# Patient Record
Sex: MALE | Race: WHITE | NOT HISPANIC OR LATINO | Employment: UNEMPLOYED | URBAN - METROPOLITAN AREA
[De-identification: names, ages, dates, MRNs, and addresses within clinical notes are randomized per-mention and may not be internally consistent; named-entity substitution may affect disease eponyms.]

---

## 2017-05-11 ENCOUNTER — OFFICE VISIT (OUTPATIENT)
Dept: URGENT CARE | Facility: CLINIC | Age: 37
End: 2017-05-11
Payer: COMMERCIAL

## 2017-05-11 VITALS
HEART RATE: 102 BPM | OXYGEN SATURATION: 98 % | RESPIRATION RATE: 16 BRPM | TEMPERATURE: 97.6 F | SYSTOLIC BLOOD PRESSURE: 118 MMHG | DIASTOLIC BLOOD PRESSURE: 78 MMHG

## 2017-05-11 DIAGNOSIS — H10.021 PINK EYE, RIGHT: ICD-10-CM

## 2017-05-11 PROCEDURE — 99214 OFFICE O/P EST MOD 30 MIN: CPT | Performed by: FAMILY MEDICINE

## 2017-05-11 RX ORDER — NEOMYCIN POLYMYXIN B SULFATES AND DEXAMETHASONE 3.5; 10000; 1 MG/ML; [USP'U]/ML; MG/ML
2 SUSPENSION/ DROPS OPHTHALMIC 3 TIMES DAILY
Qty: 1 BOTTLE | Refills: 0 | Status: SHIPPED | OUTPATIENT
Start: 2017-05-11 | End: 2017-05-16

## 2017-05-11 ASSESSMENT — ENCOUNTER SYMPTOMS
DIZZINESS: 0
PHOTOPHOBIA: 0
FOCAL WEAKNESS: 0
EYE REDNESS: 1
DOUBLE VISION: 0
EYE PAIN: 0
CHILLS: 0
EYE DISCHARGE: 1
FEVER: 0
BLURRED VISION: 0

## 2017-05-11 NOTE — MR AVS SNAPSHOT
Dinh Gallardo   2017 8:30 AM   Office Visit   MRN: [id number]    Department:  [facility]   Dept Phone:  [phone]    Description:  Male : 1980   Provider:  Cameron Pina M.D.           Reason for Visit     Conjunctivitis Right eye X 6 days      Allergies as of 2017     No Known Allergies      You were diagnosed with     Pink eye, right   [[id number]]         Vital Signs     Blood Pressure Pulse Temperature Respirations Oxygen Saturation Smoking Status    118/78 mmHg 102 36.4 °C (97.6 °F) 16 98% Unknown If Ever Smoked      Basic Information     Date Of Birth Sex Race Ethnicity Preferred Language    1980 Male White Non- English      Health Maintenance        Date Due Completion Dates    IMM DTaP/Tdap/Td Vaccine (1 - Tdap) 3/11/2000 ---            Current Immunizations     No immunizations on file.      Below and/or attached are the medications your provider expects you to take. Review all of your home medications and newly ordered medications with your provider and/or pharmacist. Follow medication instructions as directed by your provider and/or pharmacist. Please keep your medication list with you and share with your provider. Update the information when medications are discontinued, doses are changed, or new medications (including over-the-counter products) are added; and carry medication information at all times in the event of emergency situations     Allergies:  No Known Allergies          Medications  Valid as of: May 11, 2017 -  8:56 AM    Generic Name Brand Name Tablet Size Instructions for use    Acetaminophen (Tab) TYLENOL 325 MG Take  by mouth as needed for Mild Pain.        Ascorbic Acid   Take  by mouth every day.        Calcium Carbonate Antacid   Take  by mouth as needed.        Cyclobenzaprine HCl (Tab) FLEXERIL 10 MG Take 1 Tab by mouth 3 times a day as needed for Mild Pain or Muscle Spasms ((or use qhs) (medicine will cause drowsiness)).        Hydrocodone-Acetaminophen (Tab) NORCO 5-325 MG Take  by mouth every four hours as needed for Mild Pain.        Ibuprofen (Tab) MOTRIN 600 MG Take 1 Tab by mouth every 6 hours as needed for Mild Pain.        Neomycin-Polymyxin-Dexameth (Suspension) MAXITROL 0.1 % Place 2 Drops in right eye 3 times a day for 5 days.        RaNITidine HCl (Cap) ZANTAC 150 MG Take  by mouth as needed.        TraMADol HCl (Tab) ULTRAM 50 MG Take 1 Tab by mouth every 6 hours as needed. for moderate to severe pain        .                 Medicines prescribed today were sent to:     [facility][id number] - [place], [place] - [address]    [address] [facility] [place] [address]    Phone: [phone] Fax: [phone]    Open 24 Hours?: No      Medication refill instructions:       If your prescription bottle indicates you have medication refills left, it is not necessary to call your provider’s office. Please contact your pharmacy and they will refill your medication.    If your prescription bottle indicates you do not have any refills left, you may request refills at any time through one of the following ways: The online [employer] system (except Urgent Care), by calling your provider’s office, or by asking your pharmacy to contact your provider’s office with a refill request. Medication refills are processed only during regular business hours and may not be available until the next business day. Your provider may request additional information or to have a follow-up visit with you prior to refilling your medication.   *Please Note: Medication refills are assigned a new Rx number when refilled electronically. Your pharmacy may indicate that no refills were authorized even though a new prescription for the same medication is available at the pharmacy. Please request the medicine by name with the pharmacy before contacting your provider for a refill.           MyChart Status: Patient Declined

## 2017-05-11 NOTE — Clinical Note
May 11, 2017         Patient: Dinh Gallardo   YOB: 1980   Date of Visit: 5/11/2017           To Whom it May Concern:    Dinh Gallardo was seen in my clinic on 5/11/2017. He may return to work in 2-3 days.    If you have any questions or concerns, please don't hesitate to call.        Sincerely,           Cameron Pina M.D.  Electronically Signed

## 2017-05-11 NOTE — PROGRESS NOTES
Subjective:      Dinh Gallardo is a 37 y.o. male who presents with Conjunctivitis    Chief Complaint   Patient presents with   • Conjunctivitis     Right eye X 6 days        - This is a very pleasant 37 y.o. male with complaints of Rt eye red crusty draining irritated x 3-4 days. No trauma, vision change or contact lens use.           ALLERGIES:  Review of patient's allergies indicates no known allergies.     PMH:  Past Medical History   Diagnosis Date   • Heart burn    • Indigestion    • Other specified disorder of intestines      constipation   • Dental disorder 10/6/09     left upper back tooth pain, denies infection, saw dentist approx 3wks ago   • Pain      right shoulder   • Elevated triglycerides with high cholesterol         MEDS:    Current outpatient prescriptions:   •  neomycin-polymyxin-dexamethasone (MAXITROL) 0.1 % ophthalmic suspension, Place 2 Drops in right eye 3 times a day for 5 days., Disp: 1 Bottle, Rfl: 0  •  cyclobenzaprine (FLEXERIL) 10 MG Tab, Take 1 Tab by mouth 3 times a day as needed for Mild Pain or Muscle Spasms ((or use qhs) (medicine will cause drowsiness))., Disp: 24 Tab, Rfl: 1  •  tramadol (ULTRAM) 50 MG TABS, Take 1 Tab by mouth every 6 hours as needed. for moderate to severe pain, Disp: 15 Each, Rfl: 0  •  hydrocodone-APAP (NORCO) 5-325 MG TABS, Take  by mouth every four hours as needed for Mild Pain., Disp: , Rfl:   •  acetaminophen (TYLENOL) 325 MG TABS, Take  by mouth as needed for Mild Pain., Disp: , Rfl:   •  ranitidine (ZANTAC) 150 MG capsule, Take  by mouth as needed., Disp: , Rfl:   •  Calcium Carbonate Antacid (TUMS PO), Take  by mouth as needed., Disp: , Rfl:   •  Ascorbic Acid (VITAMIN C CR PO), Take  by mouth every day., Disp: , Rfl:   •  IBUPROFEN 600 MG PO TABS, Take 1 Tab by mouth every 6 hours as needed for Mild Pain., Disp: 60 Each, Rfl: 3    ** Past medical, social, family and surgical history documented in HPI or under PMH/PSH/FH section, otherwise it  is contributory **             Conjunctivitis  Pertinent negatives include no chills or fever.       Review of Systems   Constitutional: Negative for fever and chills.   Eyes: Positive for discharge and redness. Negative for blurred vision, double vision, photophobia and pain.   Neurological: Negative for dizziness and focal weakness.          Objective:     /78 mmHg  Pulse 102  Temp(Src) 36.4 °C (97.6 °F)  Resp 16  SpO2 98%     Physical Exam   Constitutional: He appears well-developed. No distress.   HENT:   Head: Normocephalic and atraumatic.   Neck: Neck supple.   Cardiovascular: Regular rhythm.    No murmur heard.  Pulmonary/Chest: Effort normal.   Neurological: He is alert. He exhibits normal muscle tone.   Skin: Skin is warm and dry.   Psychiatric: He has a normal mood and affect. Judgment normal.   Nursing note and vitals reviewed.  Rt eye: injected w/ clear DC and yellow lid crusting. No fb/abrasions.             Assessment/Plan:         1. Pink eye, right  neomycin-polymyxin-dexamethasone (MAXITROL) 0.1 % ophthalmic suspension             Dx & d/c instructions discussed w/ patient and/or family members. Follow up w/ Prvt Dr or here in 3-4 days if not getting better, sooner if needed,  ER if worse and UC/PCP unavailable.        Possible side effects (i.e. Rash, GI upset/constipation, sedation, elevation of BP or sugars) of any medications given discussed.

## 2017-05-31 ENCOUNTER — OFFICE VISIT (OUTPATIENT)
Dept: URGENT CARE | Facility: CLINIC | Age: 37
End: 2017-05-31
Payer: COMMERCIAL

## 2017-05-31 ENCOUNTER — HOSPITAL ENCOUNTER (OUTPATIENT)
Facility: MEDICAL CENTER | Age: 37
End: 2017-05-31
Attending: PHYSICIAN ASSISTANT
Payer: COMMERCIAL

## 2017-05-31 VITALS
BODY MASS INDEX: 29.12 KG/M2 | SYSTOLIC BLOOD PRESSURE: 110 MMHG | TEMPERATURE: 99.5 F | HEIGHT: 72 IN | RESPIRATION RATE: 18 BRPM | OXYGEN SATURATION: 100 % | WEIGHT: 215 LBS | DIASTOLIC BLOOD PRESSURE: 80 MMHG | HEART RATE: 127 BPM

## 2017-05-31 DIAGNOSIS — R30.0 DYSURIA: ICD-10-CM

## 2017-05-31 DIAGNOSIS — R82.81 PYURIA: ICD-10-CM

## 2017-05-31 DIAGNOSIS — Z20.2 STD EXPOSURE: ICD-10-CM

## 2017-05-31 LAB
APPEARANCE UR: NORMAL
BILIRUB UR STRIP-MCNC: NORMAL MG/DL
COLOR UR AUTO: NORMAL
GLUCOSE UR STRIP.AUTO-MCNC: NORMAL MG/DL
KETONES UR STRIP.AUTO-MCNC: NORMAL MG/DL
LEUKOCYTE ESTERASE UR QL STRIP.AUTO: NORMAL
NITRITE UR QL STRIP.AUTO: NORMAL
PH UR STRIP.AUTO: 6 [PH] (ref 5–8)
PROT UR QL STRIP: NORMAL MG/DL
RBC UR QL AUTO: NORMAL
SP GR UR STRIP.AUTO: 1.01
UROBILINOGEN UR STRIP-MCNC: 0.2 MG/DL

## 2017-05-31 PROCEDURE — 96372 THER/PROPH/DIAG INJ SC/IM: CPT | Performed by: PHYSICIAN ASSISTANT

## 2017-05-31 PROCEDURE — 87086 URINE CULTURE/COLONY COUNT: CPT

## 2017-05-31 PROCEDURE — 81002 URINALYSIS NONAUTO W/O SCOPE: CPT | Performed by: PHYSICIAN ASSISTANT

## 2017-05-31 PROCEDURE — 87491 CHLMYD TRACH DNA AMP PROBE: CPT

## 2017-05-31 PROCEDURE — 87077 CULTURE AEROBIC IDENTIFY: CPT

## 2017-05-31 PROCEDURE — 99214 OFFICE O/P EST MOD 30 MIN: CPT | Mod: 25 | Performed by: PHYSICIAN ASSISTANT

## 2017-05-31 PROCEDURE — 87591 N.GONORRHOEAE DNA AMP PROB: CPT

## 2017-05-31 RX ORDER — AZITHROMYCIN 500 MG/1
1000 TABLET, FILM COATED ORAL ONCE
Qty: 2 TAB | Refills: 0 | Status: SHIPPED | OUTPATIENT
Start: 2017-05-31 | End: 2017-05-31

## 2017-05-31 RX ORDER — CEFTRIAXONE SODIUM 250 MG/1
250 INJECTION, POWDER, FOR SOLUTION INTRAMUSCULAR; INTRAVENOUS ONCE
Status: COMPLETED | OUTPATIENT
Start: 2017-05-31 | End: 2017-05-31

## 2017-05-31 RX ADMIN — CEFTRIAXONE SODIUM 250 MG: 250 INJECTION, POWDER, FOR SOLUTION INTRAMUSCULAR; INTRAVENOUS at 09:09

## 2017-05-31 ASSESSMENT — ENCOUNTER SYMPTOMS
COUGH: 0
MYALGIAS: 0
PALPITATIONS: 0
HEADACHES: 1
NAUSEA: 1
BACK PAIN: 1
FEVER: 0
CHILLS: 0
FLANK PAIN: 0
VOMITING: 0
ABDOMINAL PAIN: 0
DIZZINESS: 0

## 2017-05-31 NOTE — PROGRESS NOTES
"Subjective:      Dinh Gallardo is a 37 y.o. male who presents with Penile Discharge    Current medications reviewed.  Past Medical History   Diagnosis Date   • Heart burn    • Indigestion    • Other specified disorder of intestines      constipation   • Dental disorder 10/6/09     left upper back tooth pain, denies infection, saw dentist approx 3wks ago   • Pain      right shoulder   • Elevated triglycerides with high cholesterol      Social History   Substance Use Topics   • Smoking status: Former Smoker   • Smokeless tobacco: None   • Alcohol Use: Yes     Family History Reviewed: noncontributory      Several acts of unprotected sex in the past month.  Burning with urination started 2 weeks ago, went away for 10 days and restarted in last 3 days, now has moderate pyuria with severe dysuria and is in discomfort in office.  Denies lesions around penis or on scrotum, reports scrotum is sore.      HPI    Review of Systems   Constitutional: Positive for malaise/fatigue. Negative for fever and chills.   Respiratory: Negative for cough.    Cardiovascular: Negative for chest pain and palpitations.   Gastrointestinal: Positive for nausea. Negative for vomiting and abdominal pain.   Genitourinary: Positive for dysuria and hematuria. Negative for urgency, frequency and flank pain.   Musculoskeletal: Positive for back pain. Negative for myalgias and joint pain.   Neurological: Positive for headaches. Negative for dizziness.   All other systems reviewed and are negative.         Objective:     /80 mmHg  Pulse 127  Temp(Src) 37.5 °C (99.5 °F)  Resp 18  Ht 1.829 m (6' 0.01\")  Wt 97.523 kg (215 lb)  BMI 29.15 kg/m2  SpO2 100%     Physical Exam   Constitutional: He is oriented to person, place, and time. He appears well-developed and well-nourished. No distress.   Eyes: EOM are normal. Pupils are equal, round, and reactive to light.   Cardiovascular: Normal rate, regular rhythm and normal heart sounds.  "   Pulmonary/Chest: Effort normal and breath sounds normal.   Abdominal: Soft. Normal appearance. There is tenderness in the suprapubic area. There is no CVA tenderness. Hernia confirmed negative in the right inguinal area and confirmed negative in the left inguinal area.   Genitourinary: Cremasteric reflex is present. Right testis shows swelling and tenderness. Left testis shows swelling and tenderness. Uncircumcised. Penile erythema and penile tenderness present. Discharge found.   Uncircumcised penis with mild foreskin inflammation and erythema, penile meatus mild inflammation and erythema with clear discharge. Bilateral scrotum general erythema with warmth and tenderness.   Lymphadenopathy: No inguinal adenopathy noted on the right or left side.   Neurological: He is alert and oriented to person, place, and time.   Skin: Skin is warm and dry.   Nursing note and vitals reviewed.              Assessment/Plan:     1. STD exposure  CHLAMYDIA/GC PCR URINE OR SWAB    cefTRIAXone (ROCEPHIN) injection 250 mg    azithromycin (ZITHROMAX) 500 MG tablet   2. Dysuria  CHLAMYDIA/GC PCR URINE OR SWAB    cefTRIAXone (ROCEPHIN) injection 250 mg    azithromycin (ZITHROMAX) 500 MG tablet   3. Pyuria  CHLAMYDIA/GC PCR URINE OR SWAB    cefTRIAXone (ROCEPHIN) injection 250 mg    azithromycin (ZITHROMAX) 500 MG tablet     UA: large LE, 300 protein, 50 ketones, large heme: will culture  GC/Chlam, urine culture  250 mg Rocephin IM given in clinic, 1 g azithromycin sent to pharmacy.  Christo: [phone] VM OK with cultures, will have him inform partners also with positive result.  Push fluids, rest, work note given for tonight. No sexual activity for one week after symptoms have resolved. Patient reports understanding and agrees with plan.    6/3/17: Phone call to patient, no answer, detailed voicemail left with lab results.  Urine culture positive for gonorrhea, patient was given Rocephin injection in clinic.  Gonorrhea/chlamydia testing  has not been finalized yet but informed patient on message testing, most likely show the same.  Return to urgent care if not greatly improved at this point.  Again reminded him to tell all partners of infection and restraint from sexual activity over the next week.

## 2017-05-31 NOTE — Clinical Note
May 31, 2017         Patient: Dinh Gallardo   YOB: 1980   Date of Visit: 5/31/2017           To Whom it May Concern:    Dinh Gallardo was seen in my clinic on 5/31/2017. He may return to work tomorrow.    If you have any questions or concerns, please don't hesitate to call.        Sincerely,           Johnson Mcdermott PA-C  Electronically Signed

## 2017-06-03 LAB
BACTERIA UR CULT: ABNORMAL
BACTERIA UR CULT: ABNORMAL
SIGNIFICANT IND 70042: ABNORMAL
SITE SITE: ABNORMAL
SOURCE SOURCE: ABNORMAL

## 2017-06-06 ENCOUNTER — TELEPHONE (OUTPATIENT)
Dept: URGENT CARE | Facility: CLINIC | Age: 37
End: 2017-06-06

## 2017-10-10 ENCOUNTER — APPOINTMENT (OUTPATIENT)
Dept: ADMISSIONS | Facility: MEDICAL CENTER | Age: 37
End: 2017-10-10
Attending: SURGERY
Payer: COMMERCIAL

## 2017-10-10 RX ORDER — AZITHROMYCIN 500 MG/1
500 TABLET, FILM COATED ORAL DAILY
COMMUNITY

## 2017-10-10 RX ORDER — FAMOTIDINE 20 MG/1
20 TABLET, FILM COATED ORAL
COMMUNITY

## 2017-10-10 RX ORDER — IBUPROFEN 400 MG/1
400 TABLET ORAL EVERY 6 HOURS PRN
COMMUNITY

## 2017-10-17 ENCOUNTER — HOSPITAL ENCOUNTER (OUTPATIENT)
Facility: MEDICAL CENTER | Age: 37
End: 2017-10-17
Attending: SURGERY | Admitting: SURGERY
Payer: COMMERCIAL

## 2017-10-17 VITALS
BODY MASS INDEX: 31.47 KG/M2 | HEIGHT: 72 IN | SYSTOLIC BLOOD PRESSURE: 133 MMHG | WEIGHT: 232.37 LBS | OXYGEN SATURATION: 96 % | HEART RATE: 68 BPM | DIASTOLIC BLOOD PRESSURE: 108 MMHG | RESPIRATION RATE: 18 BRPM | TEMPERATURE: 97.8 F

## 2017-10-17 PROBLEM — K42.9 UMBILICAL HERNIA WITHOUT OBSTRUCTION OR GANGRENE: Status: ACTIVE | Noted: 2017-10-17

## 2017-10-17 PROCEDURE — 700111 HCHG RX REV CODE 636 W/ 250 OVERRIDE (IP)

## 2017-10-17 PROCEDURE — 160035 HCHG PACU - 1ST 60 MINS PHASE I: Performed by: SURGERY

## 2017-10-17 PROCEDURE — C1781 MESH (IMPLANTABLE): HCPCS | Performed by: SURGERY

## 2017-10-17 PROCEDURE — 160036 HCHG PACU - EA ADDL 30 MINS PHASE I: Performed by: SURGERY

## 2017-10-17 PROCEDURE — 700101 HCHG RX REV CODE 250

## 2017-10-17 PROCEDURE — A6402 STERILE GAUZE <= 16 SQ IN: HCPCS | Performed by: SURGERY

## 2017-10-17 PROCEDURE — 160048 HCHG OR STATISTICAL LEVEL 1-5: Performed by: SURGERY

## 2017-10-17 PROCEDURE — 160025 RECOVERY II MINUTES (STATS): Performed by: SURGERY

## 2017-10-17 PROCEDURE — 160027 HCHG SURGERY MINUTES - 1ST 30 MINS LEVEL 2: Performed by: SURGERY

## 2017-10-17 PROCEDURE — 700102 HCHG RX REV CODE 250 W/ 637 OVERRIDE(OP)

## 2017-10-17 PROCEDURE — A9270 NON-COVERED ITEM OR SERVICE: HCPCS

## 2017-10-17 PROCEDURE — 160002 HCHG RECOVERY MINUTES (STAT): Performed by: SURGERY

## 2017-10-17 PROCEDURE — 160009 HCHG ANES TIME/MIN: Performed by: SURGERY

## 2017-10-17 PROCEDURE — 160038 HCHG SURGERY MINUTES - EA ADDL 1 MIN LEVEL 2: Performed by: SURGERY

## 2017-10-17 PROCEDURE — 500389 HCHG DRAIN, RESERVOIR SUCT JP 100CC: Performed by: SURGERY

## 2017-10-17 PROCEDURE — 160046 HCHG PACU - 1ST 60 MINS PHASE II: Performed by: SURGERY

## 2017-10-17 PROCEDURE — 501838 HCHG SUTURE GENERAL: Performed by: SURGERY

## 2017-10-17 PROCEDURE — 160047 HCHG PACU  - EA ADDL 30 MINS PHASE II: Performed by: SURGERY

## 2017-10-17 DEVICE — MESH VENTRALEX ST W/STRAP - 4.3CM SMALL (1EA/CA): Type: IMPLANTABLE DEVICE | Status: FUNCTIONAL

## 2017-10-17 RX ORDER — BUPIVACAINE HYDROCHLORIDE AND EPINEPHRINE 5; 5 MG/ML; UG/ML
INJECTION, SOLUTION EPIDURAL; INTRACAUDAL; PERINEURAL
Status: DISCONTINUED | OUTPATIENT
Start: 2017-10-17 | End: 2017-10-17 | Stop reason: HOSPADM

## 2017-10-17 RX ORDER — GABAPENTIN 300 MG/1
CAPSULE ORAL
Status: DISCONTINUED
Start: 2017-10-17 | End: 2017-10-17 | Stop reason: HOSPADM

## 2017-10-17 RX ORDER — LIDOCAINE HYDROCHLORIDE 10 MG/ML
0.5 INJECTION, SOLUTION INFILTRATION; PERINEURAL
Status: DISCONTINUED | OUTPATIENT
Start: 2017-10-17 | End: 2017-10-17 | Stop reason: HOSPADM

## 2017-10-17 RX ORDER — ACETAMINOPHEN 500 MG
TABLET ORAL
Status: DISCONTINUED
Start: 2017-10-17 | End: 2017-10-17 | Stop reason: HOSPADM

## 2017-10-17 RX ORDER — MIDAZOLAM HYDROCHLORIDE 1 MG/ML
INJECTION INTRAMUSCULAR; INTRAVENOUS
Status: DISCONTINUED
Start: 2017-10-17 | End: 2017-10-17 | Stop reason: HOSPADM

## 2017-10-17 RX ORDER — SODIUM CHLORIDE, SODIUM LACTATE, POTASSIUM CHLORIDE, CALCIUM CHLORIDE 600; 310; 30; 20 MG/100ML; MG/100ML; MG/100ML; MG/100ML
INJECTION, SOLUTION INTRAVENOUS CONTINUOUS
Status: DISCONTINUED | OUTPATIENT
Start: 2017-10-17 | End: 2017-10-17 | Stop reason: HOSPADM

## 2017-10-17 RX ORDER — LIDOCAINE AND PRILOCAINE 25; 25 MG/G; MG/G
1 CREAM TOPICAL
Status: DISCONTINUED | OUTPATIENT
Start: 2017-10-17 | End: 2017-10-17 | Stop reason: HOSPADM

## 2017-10-17 RX ORDER — CELECOXIB 200 MG/1
CAPSULE ORAL
Status: DISCONTINUED
Start: 2017-10-17 | End: 2017-10-17 | Stop reason: HOSPADM

## 2017-10-17 RX ADMIN — HYDROCODONE BITARTRATE AND ACETAMINOPHEN 15 ML: 2.5; 108 SOLUTION ORAL at 16:20

## 2017-10-17 RX ADMIN — SODIUM CHLORIDE, SODIUM LACTATE, POTASSIUM CHLORIDE, CALCIUM CHLORIDE: 600; 310; 30; 20 INJECTION, SOLUTION INTRAVENOUS at 14:15

## 2017-10-17 ASSESSMENT — PAIN SCALES - GENERAL
PAINLEVEL_OUTOF10: 0

## 2017-10-17 NOTE — DISCHARGE INSTRUCTIONS
ACTIVITY: Rest and take it easy for the first 24 hours.  A responsible adult is recommended to remain with you during that time.  It is normal to feel sleepy.  We encourage you to not do anything that requires balance, judgment or coordination.    MILD FLU-LIKE SYMPTOMS ARE NORMAL. YOU MAY EXPERIENCE GENERALIZED MUSCLE ACHES, THROAT IRRITATION, HEADACHE AND/OR SOME NAUSEA.    FOR 24 HOURS DO NOT:  Drive, operate machinery or run household appliances.  Drink beer or alcoholic beverages.   Make important decisions or sign legal documents.    SPECIAL INSTRUCTIONS: No twisting of abdomen.    DIET: To avoid nausea, slowly advance diet as tolerated, avoiding spicy or greasy foods for the first day.  Add more substantial food to your diet according to your physician's instructions.  Babies can be fed formula or breast milk as soon as they are hungry.  INCREASE FLUIDS AND FIBER TO AVOID CONSTIPATION.    SURGICAL DRESSING/BATHING: Patient may shower.    FOLLOW-UP APPOINTMENT:  A follow-up appointment should be arranged with your doctor in one week; call to schedule.    You should CALL YOUR PHYSICIAN if you develop:  Fever greater than 101 degrees F.  Pain not relieved by medication, or persistent nausea or vomiting.  Excessive bleeding (blood soaking through dressing) or unexpected drainage from the wound.  Extreme redness or swelling around the incision site, drainage of pus or foul smelling drainage.  Inability to urinate or empty your bladder within 8 hours.  Problems with breathing or chest pain.    You should call 911 if you develop problems with breathing or chest pain.  If you are unable to contact your doctor or surgical center, you should go to the nearest emergency room or urgent care center.  Physician's telephone #: [phone]    If any questions arise, call your doctor.  If your doctor is not available, please feel free to call the Surgical Center at {Surgical Dept Numbers:[id number]}.  The Center is open Monday  through Friday from 7AM to 7PM.  You can also call the HEALTH HOTLINE open 24 hours/day, 7 days/week and speak to a nurse at [phone], or toll free at [phone].    A registered nurse may call you a few days after your surgery to see how you are doing after your procedure.    MEDICATIONS: Resume taking daily medication.  Take prescribed pain medication with food.  If no medication is prescribed, you may take non-aspirin pain medication if needed.  PAIN MEDICATION CAN BE VERY CONSTIPATING.  Take a stool softener or laxative such as senokot, pericolace, or milk of magnesia if needed.    Prescription given for Norco.  Last pain medication given at 4:30 pm.    If your physician has prescribed pain medication that includes Acetaminophen (Tylenol), do not take additional Acetaminophen (Tylenol) while taking the prescribed medication.    Depression / Suicide Risk    As you are discharged from this [facility] facility, it is important to learn how to keep safe from harming yourself.    Recognize the warning signs:  · Abrupt changes in personality, positive or negative- including increase in energy   · Giving away possessions  · Change in eating patterns- significant weight changes-  positive or negative  · Change in sleeping patterns- unable to sleep or sleeping all the time   · Unwillingness or inability to communicate  · Depression  · Unusual sadness, discouragement and loneliness  · Talk of wanting to die  · Neglect of personal appearance   · Rebelliousness- reckless behavior  · Withdrawal from people/activities they love  · Confusion- inability to concentrate     If you or a loved one observes any of these behaviors or has concerns about self-harm, here's what you can do:  · Talk about it- your feelings and reasons for harming yourself  · Remove any means that you might use to hurt yourself (examples: pills, rope, extension cords, firearm)  · Get professional help from the community (Mental Health,  Substance Abuse, psychological counseling)  · Do not be alone:Call your Safe Contact- someone whom you trust who will be there for you.  · Call your local CRISIS HOTLINE 411-5888 or 971-624-6297  · Call your local Children's Mobile Crisis Response Team [place] (457) 316-3645 or www.[employer]  · Call the toll free National Suicide Prevention Hotlines   · National Suicide Prevention Lifeline 330-535-MPQV (2919)  · National Hope Line Network 800-SUICIDE (370-7298)

## 2017-10-17 NOTE — OP REPORT
DATE OF SERVICE:  10/17/2017    PREOPERATIVE DIAGNOSIS:  Incarcerated umbilical hernia.    POSTOPERATIVE DIAGNOSIS:  Incarcerated umbilical hernia.    OPERATION PERFORMED:  Repair of incarcerated umbilical hernia with mesh   implantation, reconstruction.    SURGEON:  Floyd Panda MD    ANESTHESIOLOGIST:  Curly Lennon MD    ANESTHESIA:  General.    OPERATIVE NOTE:  The patient, 37 years of age, presents with an incarcerated   umbilical hernia.  The patient was felt to be a candidate for elective repair.    The risks, possible complications of operation were explained to the patient   in detail.  He understood and accepted those risks and wished to proceed.  He   did receive postoperative care instructions, prophylactic antibiotics, had   sequential stockings applied as anti-embolism prophylaxis.  He did receive a   prescription for Norco 7.5/325, #30.  He was taken to the operating room and   placed under anesthesia by Dr. Lennon.  His abdomen was shaved and prepped   with ChloraPrep solution, sterile drapes were applied.  A time-out was   affected.  A solution of 0.5% Marcaine with epinephrine was liberally   infiltrated around the umbilicus.  A supraumbilical incision was made, carried   down through skin into the subcutaneous tissue.  The umbilical sac was then   circumferentially mobilized down to the fascia using sharp dissection   electrocautery.  Ultimately, the fascial attachments were released   circumferentially using electrocautery and the sac was reduced.  The   preperitoneal space was created using electrocautery and countertraction.  A   4.3 cm Ventralex ST mesh was inserted, unfurled, and lay smoothly.  The tails   were resected.  The fascia was closed through the tails using interrupted 2-0   PDS sutures.  Additional Marcaine was injected in the subcutaneous tissues and   fascial levels.  The patient had the umbilical skin tacked down using a 2-0   Vicryl suture to the superior fascia.  The  patient had the skin closed using   running 4-0 Monocryl subcuticular and then sealed with Dermabond.  The patient   was awakened, extubated and taken to recovery room in stable satisfactory   condition.  Blood loss was truly minimal.  Sponge, instrument counts were   correct and there were no intraoperative complications.       ____________________________________     MD TOBIAS WASHINGTON / OMID    DD:  10/17/2017 16:10:10  DT:  10/17/2017 16:23:56    D#:  [id number]  Job#:  [id number]    cc: PRICILA HERNANDEZ MD

## 2017-10-17 NOTE — OR SURGEON
Operative Report    PreOp Diagnosis: inc [facility]    PostOp Diagnosis: trey    Procedure(s):   Inc UMBILICAL HERNIA REPAIR WITH MESH PLACEMENT - Wound Class: Clean    Surgeon(s):  Floyd Panda M.D.    Anesthesiologist/Type of Anesthesia:  Anesthesiologist: Curly Alejo M.D./General    Surgical Staff:  Circulator: Emani Paige R.N.  Scrub Person: Mila Ceron    Specimens:  0    Estimated Blood Loss: min    Findings: inc     Complications: 0        10/17/2017 4:06 PM Floyd Panad

## 2018-04-12 ENCOUNTER — OFFICE VISIT (OUTPATIENT)
Dept: URGENT CARE | Facility: CLINIC | Age: 38
End: 2018-04-12
Payer: COMMERCIAL

## 2018-04-12 ENCOUNTER — HOSPITAL ENCOUNTER (OUTPATIENT)
Facility: MEDICAL CENTER | Age: 38
End: 2018-04-12
Attending: FAMILY MEDICINE
Payer: COMMERCIAL

## 2018-04-12 VITALS
SYSTOLIC BLOOD PRESSURE: 132 MMHG | OXYGEN SATURATION: 97 % | RESPIRATION RATE: 16 BRPM | DIASTOLIC BLOOD PRESSURE: 94 MMHG | HEIGHT: 72 IN | HEART RATE: 84 BPM | BODY MASS INDEX: 32.51 KG/M2 | WEIGHT: 240 LBS | TEMPERATURE: 97.9 F

## 2018-04-12 DIAGNOSIS — M10.00 ACUTE IDIOPATHIC GOUT, UNSPECIFIED SITE: ICD-10-CM

## 2018-04-12 DIAGNOSIS — M79.672 FOOT PAIN, LEFT: ICD-10-CM

## 2018-04-12 DIAGNOSIS — Z21 HIV POSITIVE (HCC): ICD-10-CM

## 2018-04-12 DIAGNOSIS — M10.072 ACUTE IDIOPATHIC GOUT OF LEFT FOOT: ICD-10-CM

## 2018-04-12 LAB
BASOPHILS # BLD AUTO: 0.7 % (ref 0–1.8)
BASOPHILS # BLD: 0.08 K/UL (ref 0–0.12)
EOSINOPHIL # BLD AUTO: 0.2 K/UL (ref 0–0.51)
EOSINOPHIL NFR BLD: 1.7 % (ref 0–6.9)
ERYTHROCYTE [DISTWIDTH] IN BLOOD BY AUTOMATED COUNT: 40.5 FL (ref 35.9–50)
HCT VFR BLD AUTO: 47.6 % (ref 42–52)
HGB BLD-MCNC: 16.4 G/DL (ref 14–18)
IMM GRANULOCYTES # BLD AUTO: 0.05 K/UL (ref 0–0.11)
IMM GRANULOCYTES NFR BLD AUTO: 0.4 % (ref 0–0.9)
LYMPHOCYTES # BLD AUTO: 3.77 K/UL (ref 1–4.8)
LYMPHOCYTES NFR BLD: 32.4 % (ref 22–41)
MCH RBC QN AUTO: 30.4 PG (ref 27–33)
MCHC RBC AUTO-ENTMCNC: 34.5 G/DL (ref 33.7–35.3)
MCV RBC AUTO: 88.3 FL (ref 81.4–97.8)
MONOCYTES # BLD AUTO: 0.9 K/UL (ref 0–0.85)
MONOCYTES NFR BLD AUTO: 7.7 % (ref 0–13.4)
NEUTROPHILS # BLD AUTO: 6.64 K/UL (ref 1.82–7.42)
NEUTROPHILS NFR BLD: 57.1 % (ref 44–72)
NRBC # BLD AUTO: 0 K/UL
NRBC BLD-RTO: 0 /100 WBC
PLATELET # BLD AUTO: 274 K/UL (ref 164–446)
PMV BLD AUTO: 11.6 FL (ref 9–12.9)
RBC # BLD AUTO: 5.39 M/UL (ref 4.7–6.1)
URATE SERPL-MCNC: 9.2 MG/DL (ref 2.5–8.3)
WBC # BLD AUTO: 11.6 K/UL (ref 4.8–10.8)

## 2018-04-12 PROCEDURE — 85025 COMPLETE CBC W/AUTO DIFF WBC: CPT

## 2018-04-12 PROCEDURE — 84550 ASSAY OF BLOOD/URIC ACID: CPT

## 2018-04-12 PROCEDURE — 99214 OFFICE O/P EST MOD 30 MIN: CPT | Performed by: FAMILY MEDICINE

## 2018-04-12 RX ORDER — INDOMETHACIN 50 MG/1
50 CAPSULE ORAL 3 TIMES DAILY PRN
Qty: 15 CAP | Refills: 0 | Status: SHIPPED | OUTPATIENT
Start: 2018-04-12

## 2018-04-12 RX ORDER — AMOXICILLIN AND CLAVULANATE POTASSIUM 875; 125 MG/1; MG/1
1 TABLET, FILM COATED ORAL 2 TIMES DAILY
Qty: 20 TAB | Refills: 0 | Status: SHIPPED | OUTPATIENT
Start: 2018-04-12

## 2018-04-12 RX ORDER — HYDROCODONE BITARTRATE AND ACETAMINOPHEN 5; 300 MG/1; MG/1
1 TABLET ORAL 3 TIMES DAILY PRN
Qty: 15 TAB | Refills: 0 | Status: SHIPPED | OUTPATIENT
Start: 2018-04-12 | End: 2018-04-17

## 2018-04-12 RX ORDER — ACETAMINOPHEN 500 MG
500-1000 TABLET ORAL EVERY 6 HOURS PRN
COMMUNITY

## 2018-04-12 ASSESSMENT — ENCOUNTER SYMPTOMS
PSYCHIATRIC NEGATIVE: 1
DIAPHORESIS: 0
NAUSEA: 0
WEAKNESS: 0
EYES NEGATIVE: 1
FEVER: 0
HEADACHES: 0
VOMITING: 0
CHILLS: 0
DIZZINESS: 0
SORE THROAT: 0
CARDIOVASCULAR NEGATIVE: 1
FALLS: 0
RESPIRATORY NEGATIVE: 1

## 2018-04-12 NOTE — PROGRESS NOTES
Subjective:      Dinh Gallardo is a 38 y.o. male who presents with Foot Swelling (left foot, started today around 2am, has progressed, first time, suspects due to family med hx, no injury)    Patient denies any history of recent trauma. He awoke at 2 AM this morning with intense lasts foot pain laterally he noticed some redness as well denies feeling feverish or nauseous states that his dad and brother have a history of gout he was assuming this is possibly his first episode with it. Denies any numbness or weakness distally. Patient does have a history of being HIV positive has been stable on this medication recently have labs in January with a good T-cell count of 1500 and virus load undetectable    HPI  Review of Systems   Constitutional: Negative for chills, diaphoresis, fever and malaise/fatigue.   HENT: Negative for sore throat.    Eyes: Negative.    Respiratory: Negative.    Cardiovascular: Negative.    Gastrointestinal: Negative for nausea and vomiting.   Genitourinary: Negative.    Musculoskeletal: Positive for joint pain. Negative for falls.   Skin: Negative for itching and rash.   Neurological: Negative for dizziness, weakness and headaches.   Endo/Heme/Allergies: Negative.    Psychiatric/Behavioral: Negative.      PMH:  has a past medical history of Cold (10/07/2017); Dental disorder (10/6/09); Elevated triglycerides with high cholesterol; Heart burn; HIV (human immunodeficiency virus infection) (CMS-[facility]); Indigestion; Other specified disorder of intestines (10/08/2017); Pain; and Psychiatric problem.  MEDS:   Current Outpatient Prescriptions:   •  acetaminophen (TYLENOL) 500 MG Tab, Take 500-1,000 mg by mouth every 6 hours as needed., Disp: , Rfl:   •  Abacavir-Dolutegravir-Lamivud (TRIUMEQ) 600- MG Tab, Take 1 Tab by mouth every evening., Disp: , Rfl:   •  Ascorbic Acid (VITAMIN C CR PO), Take 1 Tab by mouth every day., Disp: , Rfl:   •  azithromycin (ZITHROMAX) 500 MG tablet, Take 500 mg  "by mouth every day., Disp: , Rfl:   •  Pseudoephedrine-APAP-DM (TYLENOL COLD/FLU SEVERE DAY PO), Take 2 Tabs by mouth 2 times a day as needed., Disp: , Rfl:   •  famotidine (PEPCID) 20 MG Tab, Take 20 mg by mouth 1 time daily as needed., Disp: , Rfl:   •  ibuprofen (MOTRIN) 400 MG Tab, Take 400 mg by mouth every 6 hours as needed., Disp: , Rfl:   ALLERGIES: No Known Allergies  SURGHX:   Past Surgical History:   Procedure Laterality Date   • UMBILICAL HERNIA REPAIR  10/17/2017    Procedure: UMBILICAL HERNIA REPAIR WITH MESH PLACEMENT;  Surgeon: Floyd Panda M.D.;  Location: SURGERY [facility];  Service: General   • SHOULDER DECOMPRESSION ARTHROSCOPIC  10/13/2009    Performed by BRITTANY HALEY at [facility]   • SHOULDER ARTHROSCOPY W/ SLAP / LABRAL REPAIR  10/13/2009    Performed by BRITTANY HALEY at [facility]   • SHOULDER ARTHROSCOPY W/ BICIPITAL TENODESIS REPAIR  10/13/2009    Performed by BRITTANY HALEY at [facility]   • TONSILLECTOMY  5/2009   • SHOULDER ARTHROSCOPY  3/12/09    right   • APPENDECTOMY CHILD  1993   • OTHER SURGICAL PROCEDURE  1984    \"urinary tract surgery\"   SOCHX:  reports that he has quit smoking. He uses smokeless tobacco. He reports that he drinks alcohol. He reports that he uses drugs.  FH: Family history was reviewed, no pertinent findings to report     Objective:     /94   Pulse 84   Temp 36.6 °C (97.9 °F)   Resp 16   Ht 1.829 m (6')   Wt 108.9 kg (240 lb)   SpO2 97%   BMI 32.55 kg/m²      Physical Exam   Constitutional: He is oriented to person, place, and time. He appears well-developed and well-nourished. No distress.   HENT:   Mouth/Throat: Oropharynx is clear and moist.   Eyes: Conjunctivae and EOM are normal. Pupils are equal, round, and reactive to light.   Neck: Normal range of motion.   Cardiovascular: Normal rate, regular rhythm, normal heart sounds and intact distal pulses.  Exam reveals no gallop and no " friction rub.    No murmur heard.  Pulmonary/Chest: Effort normal and breath sounds normal. No respiratory distress. He has no wheezes. He has no rales. He exhibits no tenderness.   Musculoskeletal: Normal range of motion. He exhibits no edema or deformity.        Left foot: There is tenderness.        Feet:    Erythema of skin     Neurological: He is alert and oriented to person, place, and time.   Skin: Skin is warm. He is not diaphoretic. There is erythema.   Psychiatric: He has a normal mood and affect. His behavior is normal.         Diagnostics: uric acid elevated, cbc with wbc elevated consistent with acute gout attack patient notified to not take augmentin   Assessment/Plan:     1. Foot pain, left  indomethacin (INDOCIN) 50 MG Cap    Hydrocodone-Acetaminophen (VICODIN) 5-300 MG Tab    CONSENT FOR OPIATE PRESCRIPTION    amoxicillin-clavulanate (AUGMENTIN) 875-125 MG Tab    CBC WITH DIFFERENTIAL    URIC ACID    CANCELED: CBC WITH DIFFERENTIAL    CANCELED: URIC ACID   2. Acute idiopathic gout of left foot  indomethacin (INDOCIN) 50 MG Cap    Hydrocodone-Acetaminophen (VICODIN) 5-300 MG Tab    CONSENT FOR OPIATE PRESCRIPTION    amoxicillin-clavulanate (AUGMENTIN) 875-125 MG Tab    CBC WITH DIFFERENTIAL    URIC ACID   3. HIV positive (CMS-[facility])     4. Acute idiopathic gout, unspecified site         Chronic conditions of HIV that may potentially impact the patient's ability to recover from this infection appear stable. The patient will f/u with their pcp and continue with current chronic medications as directed.

## 2018-06-06 ENCOUNTER — HOSPITAL ENCOUNTER (OUTPATIENT)
Dept: LAB | Facility: MEDICAL CENTER | Age: 38
End: 2018-06-06
Attending: PHYSICIAN ASSISTANT
Payer: COMMERCIAL

## 2018-06-06 PROCEDURE — 86803 HEPATITIS C AB TEST: CPT

## 2018-06-06 PROCEDURE — 80053 COMPREHEN METABOLIC PANEL: CPT

## 2018-06-06 PROCEDURE — 87536 HIV-1 QUANT&REVRSE TRNSCRPJ: CPT

## 2018-06-07 LAB
ALBUMIN SERPL BCP-MCNC: 5 G/DL (ref 3.2–4.9)
ALBUMIN/GLOB SERPL: 1.9 G/DL
ALP SERPL-CCNC: 56 U/L (ref 30–99)
ALT SERPL-CCNC: 58 U/L (ref 2–50)
ANION GAP SERPL CALC-SCNC: 12 MMOL/L (ref 0–11.9)
AST SERPL-CCNC: 26 U/L (ref 12–45)
BILIRUB SERPL-MCNC: 0.7 MG/DL (ref 0.1–1.5)
BUN SERPL-MCNC: 15 MG/DL (ref 8–22)
CALCIUM SERPL-MCNC: 9.6 MG/DL (ref 8.5–10.5)
CHLORIDE SERPL-SCNC: 101 MMOL/L (ref 96–112)
CO2 SERPL-SCNC: 25 MMOL/L (ref 20–33)
CREAT SERPL-MCNC: 1.26 MG/DL (ref 0.5–1.4)
GLOBULIN SER CALC-MCNC: 2.6 G/DL (ref 1.9–3.5)
GLUCOSE SERPL-MCNC: 81 MG/DL (ref 65–99)
HCV AB SER QL: NEGATIVE
POTASSIUM SERPL-SCNC: 4.2 MMOL/L (ref 3.6–5.5)
PROT SERPL-MCNC: 7.6 G/DL (ref 6–8.2)
SODIUM SERPL-SCNC: 138 MMOL/L (ref 135–145)

## 2018-06-09 LAB
HIV1 RNA # SERPL NAA+PROBE: <20 CPY/ML
HIV1 RNA SER-IMP: NOT DETECTED
HIV1 RNA SERPL NAA+PROBE-LOG#: <1.3 LOG

## 2018-10-17 ENCOUNTER — HOSPITAL ENCOUNTER (OUTPATIENT)
Dept: LAB | Facility: MEDICAL CENTER | Age: 38
End: 2018-10-17
Attending: PHYSICIAN ASSISTANT
Payer: COMMERCIAL

## 2018-10-17 LAB
ALBUMIN SERPL BCP-MCNC: 4.7 G/DL (ref 3.2–4.9)
ALBUMIN/GLOB SERPL: 2 G/DL
ALP SERPL-CCNC: 58 U/L (ref 30–99)
ALT SERPL-CCNC: 52 U/L (ref 2–50)
ANION GAP SERPL CALC-SCNC: 10 MMOL/L (ref 0–11.9)
AST SERPL-CCNC: 29 U/L (ref 12–45)
BILIRUB SERPL-MCNC: 0.6 MG/DL (ref 0.1–1.5)
BUN SERPL-MCNC: 18 MG/DL (ref 8–22)
CALCIUM SERPL-MCNC: 9.7 MG/DL (ref 8.5–10.5)
CHLORIDE SERPL-SCNC: 104 MMOL/L (ref 96–112)
CO2 SERPL-SCNC: 28 MMOL/L (ref 20–33)
CREAT SERPL-MCNC: 1.27 MG/DL (ref 0.5–1.4)
GLOBULIN SER CALC-MCNC: 2.4 G/DL (ref 1.9–3.5)
GLUCOSE SERPL-MCNC: 68 MG/DL (ref 65–99)
POTASSIUM SERPL-SCNC: 4.2 MMOL/L (ref 3.6–5.5)
PROT SERPL-MCNC: 7.1 G/DL (ref 6–8.2)
SODIUM SERPL-SCNC: 142 MMOL/L (ref 135–145)

## 2018-10-17 PROCEDURE — 87536 HIV-1 QUANT&REVRSE TRNSCRPJ: CPT

## 2018-10-17 PROCEDURE — 80053 COMPREHEN METABOLIC PANEL: CPT

## 2018-10-21 LAB
HIV1 RNA # SERPL NAA+PROBE: ABNORMAL CPY/ML
HIV1 RNA SER-IMP: DETECTED
HIV1 RNA SERPL NAA+PROBE-LOG#: ABNORMAL LOG

## 2018-11-01 ENCOUNTER — HOSPITAL ENCOUNTER (OUTPATIENT)
Dept: RADIOLOGY | Facility: MEDICAL CENTER | Age: 38
End: 2018-11-01
Attending: FAMILY MEDICINE
Payer: COMMERCIAL

## 2018-11-01 DIAGNOSIS — K81.9 CHOLECYSTITIS, UNSPECIFIED: ICD-10-CM

## 2018-11-01 PROCEDURE — 76700 US EXAM ABDOM COMPLETE: CPT

## 2019-02-20 ENCOUNTER — HOSPITAL ENCOUNTER (OUTPATIENT)
Dept: LAB | Facility: MEDICAL CENTER | Age: 39
End: 2019-02-20
Attending: PHYSICIAN ASSISTANT
Payer: COMMERCIAL

## 2019-02-20 LAB
ALBUMIN SERPL BCP-MCNC: 5.1 G/DL (ref 3.2–4.9)
ALBUMIN/GLOB SERPL: 1.8 G/DL
ALP SERPL-CCNC: 58 U/L (ref 30–99)
ALT SERPL-CCNC: 34 U/L (ref 2–50)
ANION GAP SERPL CALC-SCNC: 9 MMOL/L (ref 0–11.9)
AST SERPL-CCNC: 23 U/L (ref 12–45)
BILIRUB SERPL-MCNC: 0.9 MG/DL (ref 0.1–1.5)
BUN SERPL-MCNC: 15 MG/DL (ref 8–22)
CALCIUM SERPL-MCNC: 10.3 MG/DL (ref 8.5–10.5)
CHLORIDE SERPL-SCNC: 102 MMOL/L (ref 96–112)
CO2 SERPL-SCNC: 28 MMOL/L (ref 20–33)
CREAT SERPL-MCNC: 1.39 MG/DL (ref 0.5–1.4)
GLOBULIN SER CALC-MCNC: 2.8 G/DL (ref 1.9–3.5)
GLUCOSE SERPL-MCNC: 84 MG/DL (ref 65–99)
POTASSIUM SERPL-SCNC: 4.6 MMOL/L (ref 3.6–5.5)
PROT SERPL-MCNC: 7.9 G/DL (ref 6–8.2)
SODIUM SERPL-SCNC: 139 MMOL/L (ref 135–145)

## 2019-02-20 PROCEDURE — 80053 COMPREHEN METABOLIC PANEL: CPT

## 2019-02-20 PROCEDURE — 87536 HIV-1 QUANT&REVRSE TRNSCRPJ: CPT

## 2019-02-22 LAB
HIV1 RNA # SERPL NAA+PROBE: NOT DETECTED CPY/ML
HIV1 RNA SERPL NAA+PROBE-LOG#: NOT DETECTED LOG CPY/ML
HIV1 RNA SERPL QL NAA+PROBE: NOT DETECTED

## 2019-03-20 ENCOUNTER — OFFICE VISIT (OUTPATIENT)
Dept: URGENT CARE | Facility: MEDICAL CENTER | Age: 39
End: 2019-03-20
Payer: COMMERCIAL

## 2019-03-20 VITALS
HEART RATE: 71 BPM | BODY MASS INDEX: 32.51 KG/M2 | DIASTOLIC BLOOD PRESSURE: 90 MMHG | TEMPERATURE: 97.4 F | SYSTOLIC BLOOD PRESSURE: 138 MMHG | HEIGHT: 72 IN | OXYGEN SATURATION: 96 % | RESPIRATION RATE: 16 BRPM | WEIGHT: 240 LBS

## 2019-03-20 DIAGNOSIS — J06.9 VIRAL URI WITH COUGH: ICD-10-CM

## 2019-03-20 DIAGNOSIS — J02.9 SORE THROAT: ICD-10-CM

## 2019-03-20 LAB
FLUAV+FLUBV AG SPEC QL IA: NEGATIVE
INT CON NEG: NEGATIVE
INT CON NEG: NEGATIVE
INT CON POS: POSITIVE
INT CON POS: POSITIVE
S PYO AG THROAT QL: NORMAL

## 2019-03-20 PROCEDURE — 87804 INFLUENZA ASSAY W/OPTIC: CPT | Performed by: NURSE PRACTITIONER

## 2019-03-20 PROCEDURE — 87880 STREP A ASSAY W/OPTIC: CPT | Performed by: NURSE PRACTITIONER

## 2019-03-20 PROCEDURE — 99214 OFFICE O/P EST MOD 30 MIN: CPT | Performed by: NURSE PRACTITIONER

## 2019-03-20 RX ORDER — BENZONATATE 200 MG/1
200 CAPSULE ORAL 3 TIMES DAILY PRN
Qty: 60 CAP | Refills: 0 | Status: SHIPPED | OUTPATIENT
Start: 2019-03-20

## 2019-03-20 RX ORDER — ALLOPURINOL 100 MG/1
100 TABLET ORAL DAILY
COMMUNITY

## 2019-03-20 ASSESSMENT — ENCOUNTER SYMPTOMS
HEADACHES: 0
STRIDOR: 0
CONSTIPATION: 0
MUSCULOSKELETAL NEGATIVE: 1
CHILLS: 1
BLURRED VISION: 0
PALPITATIONS: 0
VOMITING: 0
ABDOMINAL PAIN: 0
DOUBLE VISION: 0
FEVER: 0
SPUTUM PRODUCTION: 0
DIZZINESS: 0
WHEEZING: 0
SHORTNESS OF BREATH: 0
SWOLLEN GLANDS: 1
NAUSEA: 0
DIARRHEA: 0
HOARSE VOICE: 0
SORE THROAT: 1
COUGH: 1

## 2019-03-20 NOTE — LETTER
March 20, 2019         Patient: Dinh Gallardo   YOB: 1980   Date of Visit: 3/20/2019           To Whom it May Concern:    iDnh Gallardo was seen in my clinic on 3/20/2019. Please excuse him from work 3/20/2019 through 3/21/2019.  He may return to work 3/22/2019.    If you have any questions or concerns, please don't hesitate to call.        Sincerely,           CORNEL Perera.  Electronically Signed

## 2019-03-20 NOTE — PROGRESS NOTES
Subjective:   Dinh Gallardo is a 39 y.o. male who presents for Pharyngitis (cough, ear pain, x 1 day )        Pharyngitis    This is a new problem. The current episode started yesterday. The problem has been waxing and waning. Neither side of throat is experiencing more pain than the other. Maximum temperature: Unsure if fever. The pain is moderate. Associated symptoms include congestion, coughing and swollen glands. Pertinent negatives include no abdominal pain, diarrhea, ear discharge, ear pain, headaches, hoarse voice, shortness of breath, stridor or vomiting. He has had exposure to strep and mono. Exposure to: Recently went to [facility]. He has tried acetaminophen and NSAIDs for the symptoms. The treatment provided mild relief.        Review of Systems   Constitutional: Positive for chills. Negative for fever.   HENT: Positive for congestion and sore throat. Negative for ear discharge, ear pain and hoarse voice.    Eyes: Negative for blurred vision and double vision.   Respiratory: Positive for cough. Negative for sputum production, shortness of breath, wheezing and stridor.    Cardiovascular: Negative for chest pain and palpitations.   Gastrointestinal: Negative for abdominal pain, constipation, diarrhea, nausea and vomiting.   Musculoskeletal: Negative.    Skin: Negative.  Negative for itching and rash.   Neurological: Negative for dizziness and headaches.   All other systems reviewed and are negative.    No Known Allergies   PMH:  has a past medical history of Cold (10/07/2017); Dental disorder (10/6/09); Elevated triglycerides with high cholesterol; Heart burn; HIV (human immunodeficiency virus infection) (HCC); Indigestion; Other specified disorder of intestines (10/08/2017); Pain; and Psychiatric problem.  MEDS:   Current Outpatient Prescriptions:   •  allopurinol (ZYLOPRIM) 100 MG Tab, Take 100 mg by mouth every day., Disp: , Rfl:   •  benzonatate (TESSALON) 200 MG capsule, Take 1 Cap by mouth 3  "times a day as needed for Cough., Disp: 60 Cap, Rfl: 0  •  Abacavir-Dolutegravir-Lamivud (TRIUMEQ) 600- MG Tab, Take 1 Tab by mouth every evening., Disp: , Rfl:   •  acetaminophen (TYLENOL) 500 MG Tab, Take 500-1,000 mg by mouth every 6 hours as needed., Disp: , Rfl:   •  indomethacin (INDOCIN) 50 MG Cap, Take 1 Cap by mouth 3 times a day as needed (pain). With food, Disp: 15 Cap, Rfl: 0  •  amoxicillin-clavulanate (AUGMENTIN) 875-125 MG Tab, Take 1 Tab by mouth 2 times a day. With food, Disp: 20 Tab, Rfl: 0  •  azithromycin (ZITHROMAX) 500 MG tablet, Take 500 mg by mouth every day., Disp: , Rfl:   •  Pseudoephedrine-APAP-DM (TYLENOL COLD/FLU SEVERE DAY PO), Take 2 Tabs by mouth 2 times a day as needed., Disp: , Rfl:   •  famotidine (PEPCID) 20 MG Tab, Take 20 mg by mouth 1 time daily as needed., Disp: , Rfl:   •  ibuprofen (MOTRIN) 400 MG Tab, Take 400 mg by mouth every 6 hours as needed., Disp: , Rfl:   •  Ascorbic Acid (VITAMIN C CR PO), Take 1 Tab by mouth every day., Disp: , Rfl:   ALLERGIES: No Known Allergies  SURGHX:   Past Surgical History:   Procedure Laterality Date   • UMBILICAL HERNIA REPAIR  10/17/2017    Procedure: UMBILICAL HERNIA REPAIR WITH MESH PLACEMENT;  Surgeon: Floyd Panda M.D.;  Location: SURGERY [facility];  Service: General   • SHOULDER DECOMPRESSION ARTHROSCOPIC  10/13/2009    Performed by BRITTANY HALEY at [facility]   • SHOULDER ARTHROSCOPY W/ SLAP / LABRAL REPAIR  10/13/2009    Performed by BRITTANY HALEY at [facility]   • SHOULDER ARTHROSCOPY W/ BICIPITAL TENODESIS REPAIR  10/13/2009    Performed by BRITTANY HALEY at [facility]   • TONSILLECTOMY  5/2009   • SHOULDER ARTHROSCOPY  3/12/09    right   • APPENDECTOMY CHILD  1993   • OTHER SURGICAL PROCEDURE  1984    \"urinary tract surgery\"     SOCHX:  reports that he has quit smoking. He uses smokeless tobacco. He reports that he drinks alcohol. He reports that he uses " drugs.  FH: Family history was reviewed, no pertinent findings to report     Objective:   /90   Pulse 71   Temp 36.3 °C (97.4 °F)   Resp 16   Ht 1.829 m (6')   Wt 108.9 kg (240 lb)   SpO2 96%   BMI 32.55 kg/m²   Physical Exam   Constitutional: He is oriented to person, place, and time. He appears well-developed and well-nourished. No distress.   HENT:   Head: Normocephalic.   Right Ear: Hearing, tympanic membrane and ear canal normal. Tympanic membrane is not erythematous. No middle ear effusion.   Left Ear: Hearing and ear canal normal. Tympanic membrane is not erythematous. A middle ear effusion is present.   Nose: Mucosal edema and rhinorrhea present. Right sinus exhibits no maxillary sinus tenderness and no frontal sinus tenderness. Left sinus exhibits no maxillary sinus tenderness and no frontal sinus tenderness.   Mouth/Throat: Mucous membranes are normal. Posterior oropharyngeal erythema present. Tonsils are 0 on the right. Tonsils are 0 on the left. No tonsillar exudate.   Post nasal drip   Eyes: Pupils are equal, round, and reactive to light. Conjunctivae, EOM and lids are normal.   Neck: Normal range of motion. No thyromegaly present.   Cardiovascular: Normal rate, regular rhythm and normal heart sounds.    Pulmonary/Chest: Effort normal and breath sounds normal. No accessory muscle usage. No apnea, no tachypnea and no bradypnea. No respiratory distress. He has no decreased breath sounds. He has no wheezes.   Lymphadenopathy:        Head (right side): No submandibular and no tonsillar adenopathy present.        Head (left side): Tonsillar adenopathy present. No submandibular adenopathy present.   Neurological: He is alert and oriented to person, place, and time.   Skin: Skin is warm and dry. No rash noted. He is not diaphoretic.   Psychiatric: He has a normal mood and affect. His speech is normal and behavior is normal. Judgment and thought content normal.   Vitals reviewed.         Assessment/Plan:   Assessment    1. Sore throat  - POCT Rapid Strep A  - POCT Influenza A/B    2. Viral URI with cough  - benzonatate (TESSALON) 200 MG capsule; Take 1 Cap by mouth 3 times a day as needed for Cough.  Dispense: 60 Cap; Refill: 0    Other orders  - allopurinol (ZYLOPRIM) 100 MG Tab; Take 100 mg by mouth every day.    Rapid strep negative; negative flu  Patient declines throat culture at this time  It was explained to the patient today that due to the viral nature of the patient's illness, we will treat symptomatically. Encouraged OTC supportive meds PRN. Humidification and increase fluids.     Differential diagnosis, natural history, supportive care, and indications for immediate follow-up discussed.

## 2020-07-22 ENCOUNTER — OFFICE VISIT (OUTPATIENT)
Dept: URGENT CARE | Facility: CLINIC | Age: 40
End: 2020-07-22
Payer: COMMERCIAL

## 2020-07-22 VITALS
RESPIRATION RATE: 14 BRPM | SYSTOLIC BLOOD PRESSURE: 142 MMHG | BODY MASS INDEX: 31.02 KG/M2 | TEMPERATURE: 97.9 F | WEIGHT: 229 LBS | HEIGHT: 72 IN | DIASTOLIC BLOOD PRESSURE: 98 MMHG | HEART RATE: 71 BPM | OXYGEN SATURATION: 97 %

## 2020-07-22 DIAGNOSIS — S13.9XXA NECK SPRAIN, INITIAL ENCOUNTER: ICD-10-CM

## 2020-07-22 DIAGNOSIS — M62.838 CERVICAL PARASPINAL MUSCLE SPASM: ICD-10-CM

## 2020-07-22 PROCEDURE — 99214 OFFICE O/P EST MOD 30 MIN: CPT | Performed by: FAMILY MEDICINE

## 2020-07-22 RX ORDER — NAPROXEN 500 MG/1
500 TABLET ORAL 2 TIMES DAILY WITH MEALS
Qty: 20 TAB | Refills: 0 | Status: SHIPPED | OUTPATIENT
Start: 2020-07-22 | End: 2020-08-01

## 2020-07-22 RX ORDER — CYCLOBENZAPRINE HCL 10 MG
10 TABLET ORAL NIGHTLY PRN
Qty: 10 TAB | Refills: 0 | Status: SHIPPED | OUTPATIENT
Start: 2020-07-22 | End: 2020-08-01

## 2020-07-22 ASSESSMENT — ENCOUNTER SYMPTOMS
FEVER: 0
CHILLS: 0
WEAKNESS: 0
MYALGIAS: 0
VISUAL CHANGE: 0
NUMBNESS: 0
SHORTNESS OF BREATH: 0
NECK PAIN: 1
TINGLING: 0
BACK PAIN: 1
SORE THROAT: 0
VOMITING: 0
NAUSEA: 0

## 2020-07-23 NOTE — PATIENT INSTRUCTIONS
Muscle Cramps and Spasms  Muscle cramps and spasms are when muscles tighten by themselves. They usually get better within minutes. Muscle cramps are painful. They are usually stronger and last longer than muscle spasms. Muscle spasms may or may not be painful. They can last a few seconds or much longer.  Cramps and spasms can affect any muscle, but they occur most often in the calf muscles of the leg. They are usually not caused by a serious problem. In many cases, the cause is not known. Some common causes include:  · Doing more physical work or exercise than your body is ready for.  · Using the muscles too much (overuse) by repeating certain movements too many times.  · Staying in a certain position for a long time.  · Playing a sport or doing an activity without preparing properly.  · Using bad form or technique while playing a sport or doing an activity.  · Not having enough water in your body (dehydration).  · Injury.  · Side effects of some medicines.  · Low levels of the salts and minerals in your blood (electrolytes), such as low potassium or calcium.  Follow these instructions at home:  Managing pain and stiffness         · Massage, stretch, and relax the muscle. Do this for many minutes at a time.  · If told, put heat on tight or tense muscles as often as told by your doctor. Use the heat source that your doctor recommends, such as a moist heat pack or a heating pad.  ? Place a towel between your skin and the heat source.  ? Leave the heat on for 20-30 minutes.  ? Remove the heat if your skin turns bright red. This is very important if you are not able to feel pain, heat, or cold. You may have a greater risk of getting burned.  · If told, put ice on the affected area. This may help if you are sore or have pain after a cramp or spasm.  ? Put ice in a plastic bag.  ? Place a towel between your skin and the bag.  ? Leave the ice on for 20 minutes, 2-3 times a day.  · Try taking hot showers or baths to help  relax tight muscles.  Eating and drinking  · Drink enough fluid to keep your pee (urine) pale yellow.  · Eat a healthy diet to help ensure that your muscles work well. This should include:  ? Fruits and vegetables.  ? Lean protein.  ? Whole grains.  ? Low-fat or nonfat dairy products.  General instructions  · If you are having cramps often, avoid intense exercise for several days.  · Take over-the-counter and prescription medicines only as told by your doctor.  · Watch for any changes in your symptoms.  · Keep all follow-up visits as told by your doctor. This is important.  Contact a doctor if:  · Your cramps or spasms get worse or happen more often.  · Your cramps or spasms do not get better with time.  Summary  · Muscle cramps and spasms are when muscles tighten by themselves. They usually get better within minutes.  · Cramps and spasms occur most often in the calf muscles of the leg.  · Massage, stretch, and relax the muscle. This may help the cramp or spasm go away.  · Drink enough fluid to keep your pee (urine) pale yellow.  This information is not intended to replace advice given to you by your health care provider. Make sure you discuss any questions you have with your health care provider.  Document Released: 11/30/2009 Document Revised: 05/13/2019 Document Reviewed: 05/13/2019  Elsevier Patient Education © 2020 [employer]

## 2020-07-23 NOTE — PROGRESS NOTES
Subjective:   Dinh Gallardo is a 40 y.o. male who presents for Back Pain (neck/ shoulder//  5/6 x days )        Back Pain   Pertinent negatives include no chest pain, fever, numbness, tingling or weakness.   Neck Pain    This is a new problem. Episode onset: 5 days. The problem occurs constantly. The problem has been unchanged. Associated with: sleeping on poor quality mattress. The pain is present in the left side, right side and occipital region (thoracic back). The symptoms are aggravated by twisting, position and bending. The pain is same all the time. Stiffness is present all day. Pertinent negatives include no chest pain, fever, numbness, tingling, visual change or weakness. Treatments tried: relative rest. The treatment provided no relief.     PMH:  has a past medical history of Cold (10/07/2017), Dental disorder (10/6/09), Elevated triglycerides with high cholesterol, Heart burn, HIV (human immunodeficiency virus infection) (HCC), Indigestion, Other specified disorder of intestines (10/08/2017), Pain, and Psychiatric problem.  MEDS:   Current Outpatient Medications:   •  cyclobenzaprine (FLEXERIL) 10 MG Tab, Take 1 Tab by mouth at bedtime as needed for up to 10 days., Disp: 10 Tab, Rfl: 0  •  naproxen (NAPROSYN) 500 MG Tab, Take 1 Tab by mouth 2 times a day, with meals for 10 days., Disp: 20 Tab, Rfl: 0  •  Abacavir-Dolutegravir-Lamivud (TRIUMEQ) 600- MG Tab, Take 1 Tab by mouth every evening., Disp: , Rfl:   •  allopurinol (ZYLOPRIM) 100 MG Tab, Take 100 mg by mouth every day., Disp: , Rfl:   •  benzonatate (TESSALON) 200 MG capsule, Take 1 Cap by mouth 3 times a day as needed for Cough., Disp: 60 Cap, Rfl: 0  •  acetaminophen (TYLENOL) 500 MG Tab, Take 500-1,000 mg by mouth every 6 hours as needed., Disp: , Rfl:   •  indomethacin (INDOCIN) 50 MG Cap, Take 1 Cap by mouth 3 times a day as needed (pain). With food, Disp: 15 Cap, Rfl: 0  •  amoxicillin-clavulanate (AUGMENTIN) 875-125 MG Tab, Take  "1 Tab by mouth 2 times a day. With food (Patient not taking: Reported on 7/22/2020), Disp: 20 Tab, Rfl: 0  •  azithromycin (ZITHROMAX) 500 MG tablet, Take 500 mg by mouth every day., Disp: , Rfl:   •  Pseudoephedrine-APAP-DM (TYLENOL COLD/FLU SEVERE DAY PO), Take 2 Tabs by mouth 2 times a day as needed., Disp: , Rfl:   •  famotidine (PEPCID) 20 MG Tab, Take 20 mg by mouth 1 time daily as needed., Disp: , Rfl:   •  ibuprofen (MOTRIN) 400 MG Tab, Take 400 mg by mouth every 6 hours as needed., Disp: , Rfl:   •  Ascorbic Acid (VITAMIN C CR PO), Take 1 Tab by mouth every day., Disp: , Rfl:   ALLERGIES: No Known Allergies  SURGHX:   Past Surgical History:   Procedure Laterality Date   • UMBILICAL HERNIA REPAIR  10/17/2017    Procedure: UMBILICAL HERNIA REPAIR WITH MESH PLACEMENT;  Surgeon: Floyd Panda M.D.;  Location: SURGERY [facility];  Service: General   • SHOULDER DECOMPRESSION ARTHROSCOPIC  10/13/2009    Performed by BRITTANY HALEY at [facility]   • SHOULDER ARTHROSCOPY W/ SLAP / LABRAL REPAIR  10/13/2009    Performed by BRITTANY HALEY at [facility]   • SHOULDER ARTHROSCOPY W/ BICIPITAL TENODESIS REPAIR  10/13/2009    Performed by BRITTANY HALEY at [facility]   • TONSILLECTOMY  5/2009   • SHOULDER ARTHROSCOPY  3/12/09    right   • APPENDECTOMY CHILD  1993   • OTHER SURGICAL PROCEDURE  1984    \"urinary tract surgery\"     SOCHX:  reports that he has quit smoking. He uses smokeless tobacco. He reports current alcohol use. He reports current drug use. Drug: Marijuana.  FH:   Family History   Problem Relation Age of Onset   • Non-contributory Neg Hx      Review of Systems   Constitutional: Negative for chills and fever.   HENT: Negative for sore throat.    Respiratory: Negative for shortness of breath.    Cardiovascular: Negative for chest pain.   Gastrointestinal: Negative for nausea and vomiting.   Musculoskeletal: Positive for back pain and neck pain. " Negative for myalgias.   Neurological: Negative for tingling, weakness and numbness.        Objective:   /98 (BP Location: Right arm, Patient Position: Sitting, BP Cuff Size: Adult long)   Pulse 71   Temp 36.6 °C (97.9 °F) (Temporal)   Resp 14   Ht 1.829 m (6')   Wt 103.9 kg (229 lb)   SpO2 97%   BMI 31.06 kg/m²   Physical Exam  Vitals signs and nursing note reviewed.   Constitutional:       General: He is not in acute distress.     Appearance: He is well-developed.   HENT:      Head: Normocephalic and atraumatic.      Right Ear: External ear normal.      Left Ear: External ear normal.      Nose: Nose normal.      Mouth/Throat:      Mouth: Mucous membranes are moist.   Eyes:      Conjunctiva/sclera: Conjunctivae normal.   Cardiovascular:      Rate and Rhythm: Normal rate.   Pulmonary:      Effort: Pulmonary effort is normal. No respiratory distress.      Breath sounds: Normal breath sounds.   Abdominal:      General: There is no distension.   Musculoskeletal:      Cervical back: He exhibits decreased range of motion, tenderness and spasm. He exhibits no bony tenderness.      Thoracic back: He exhibits spasm.   Skin:     General: Skin is warm and dry.   Neurological:      General: No focal deficit present.      Mental Status: He is alert and oriented to person, place, and time. Mental status is at baseline.      Sensory: Sensation is intact.      Motor: Motor function is intact.      Coordination: Coordination is intact.      Gait: Gait (gait at baseline) normal.   Psychiatric:         Judgment: Judgment normal.           Assessment/Plan:   1. Neck sprain, initial encounter  - naproxen (NAPROSYN) 500 MG Tab; Take 1 Tab by mouth 2 times a day, with meals for 10 days.  Dispense: 20 Tab; Refill: 0    2. Cervical paraspinal muscle spasm  - cyclobenzaprine (FLEXERIL) 10 MG Tab; Take 1 Tab by mouth at bedtime as needed for up to 10 days.  Dispense: 10 Tab; Refill: 0      Discussed close monitoring, return  precautions, and supportive measures of maintaining adequate fluid hydration and caloric intake, relative rest and symptom management as needed for pain and/or fever.    Differential diagnosis, natural history, supportive care, and indications for immediate follow-up discussed.     Advised the patient to follow-up with the primary care physician for recheck, reevaluation, and consideration of further management.    Please note that this dictation was created using voice recognition software. I have worked with consultants from the vendor as well as technical experts from [employer] to optimize the interface. I have made every reasonable attempt to correct obvious errors, but I expect that there are errors of grammar and possibly content that I did not discover before finalizing the note.

## 2020-11-22 ENCOUNTER — OFFICE VISIT (OUTPATIENT)
Dept: URGENT CARE | Facility: CLINIC | Age: 40
End: 2020-11-22
Payer: COMMERCIAL

## 2020-11-22 VITALS
OXYGEN SATURATION: 99 % | TEMPERATURE: 96.1 F | BODY MASS INDEX: 31.02 KG/M2 | WEIGHT: 229 LBS | HEART RATE: 97 BPM | HEIGHT: 72 IN | DIASTOLIC BLOOD PRESSURE: 80 MMHG | RESPIRATION RATE: 16 BRPM | SYSTOLIC BLOOD PRESSURE: 128 MMHG

## 2020-11-22 DIAGNOSIS — M10.071 ACUTE IDIOPATHIC GOUT OF RIGHT FOOT: ICD-10-CM

## 2020-11-22 PROCEDURE — 99214 OFFICE O/P EST MOD 30 MIN: CPT | Mod: 25 | Performed by: NURSE PRACTITIONER

## 2020-11-22 PROCEDURE — 96372 THER/PROPH/DIAG INJ SC/IM: CPT | Performed by: NURSE PRACTITIONER

## 2020-11-22 RX ORDER — KETOROLAC TROMETHAMINE 30 MG/ML
30 INJECTION, SOLUTION INTRAMUSCULAR; INTRAVENOUS ONCE
Status: COMPLETED | OUTPATIENT
Start: 2020-11-22 | End: 2020-11-22

## 2020-11-22 RX ADMIN — KETOROLAC TROMETHAMINE 30 MG: 30 INJECTION, SOLUTION INTRAMUSCULAR; INTRAVENOUS at 16:01

## 2020-11-22 ASSESSMENT — ENCOUNTER SYMPTOMS
NERVOUS/ANXIOUS: 0
VOMITING: 0
SHORTNESS OF BREATH: 0
ABDOMINAL PAIN: 0
HEADACHES: 0
SORE THROAT: 0
EYE PAIN: 0
FEVER: 0
FALLS: 0
ORTHOPNEA: 0
MYALGIAS: 1
JOINT SWELLING: 1
DIZZINESS: 0
CHILLS: 0
COUGH: 0
NAUSEA: 0
WEAKNESS: 0

## 2020-11-22 NOTE — PROGRESS NOTES
Subjective:   Dinh Gallardo is a 40 y.o. male who presents for Gout (x1 day, (R) foot, unable to walk )       Foot Problem  This is a new problem. The current episode started yesterday. The problem occurs constantly. The problem has been gradually worsening. Associated symptoms include joint swelling (right heel and ankle) and myalgias. Pertinent negatives include no abdominal pain, chest pain, chills, congestion, coughing, fever, headaches, nausea, rash, sore throat, vomiting or weakness. The symptoms are aggravated by walking and standing. He has tried position changes, ice and NSAIDs for the symptoms. The treatment provided no relief.     Pt presents for evaluation of a new problem, reports 2-day history of right foot and heel pain.  States that it began as a low, dull achy type of pain that he has had in the past, which has progressed today to significant pain with walking requiring use of a cane.  Has a history of gout, states this feels like his attacks in the past.  States that he was on allopurinol in the past, however was taken off of it by another care provider.  Denies any trauma, injury, or other causative reasons for right foot and ankle pain.    Review of Systems   Constitutional: Negative for chills, fever and malaise/fatigue.   HENT: Negative for congestion and sore throat.    Eyes: Negative for pain.   Respiratory: Negative for cough and shortness of breath.    Cardiovascular: Negative for chest pain, orthopnea and leg swelling.   Gastrointestinal: Negative for abdominal pain, nausea and vomiting.   Genitourinary: Negative for dysuria.   Musculoskeletal: Positive for joint pain, joint swelling (right heel and ankle) and myalgias. Negative for falls.   Skin: Negative for rash.   Neurological: Negative for dizziness, weakness and headaches.   Psychiatric/Behavioral: The patient is not nervous/anxious.    All other systems reviewed and are negative.      MEDS:   Current Outpatient Medications:    •  Abacavir-Dolutegravir-Lamivud (TRIUMEQ) 600- MG Tab, Take 1 Tab by mouth every evening., Disp: , Rfl:   •  allopurinol (ZYLOPRIM) 100 MG Tab, Take 100 mg by mouth every day., Disp: , Rfl:   •  benzonatate (TESSALON) 200 MG capsule, Take 1 Cap by mouth 3 times a day as needed for Cough. (Patient not taking: Reported on 11/22/2020), Disp: 60 Cap, Rfl: 0  •  acetaminophen (TYLENOL) 500 MG Tab, Take 500-1,000 mg by mouth every 6 hours as needed., Disp: , Rfl:   •  indomethacin (INDOCIN) 50 MG Cap, Take 1 Cap by mouth 3 times a day as needed (pain). With food (Patient not taking: Reported on 11/22/2020), Disp: 15 Cap, Rfl: 0  •  amoxicillin-clavulanate (AUGMENTIN) 875-125 MG Tab, Take 1 Tab by mouth 2 times a day. With food (Patient not taking: Reported on 7/22/2020), Disp: 20 Tab, Rfl: 0  •  azithromycin (ZITHROMAX) 500 MG tablet, Take 500 mg by mouth every day., Disp: , Rfl:   •  Pseudoephedrine-APAP-DM (TYLENOL COLD/FLU SEVERE DAY PO), Take 2 Tabs by mouth 2 times a day as needed., Disp: , Rfl:   •  famotidine (PEPCID) 20 MG Tab, Take 20 mg by mouth 1 time daily as needed., Disp: , Rfl:   •  ibuprofen (MOTRIN) 400 MG Tab, Take 400 mg by mouth every 6 hours as needed., Disp: , Rfl:   •  Ascorbic Acid (VITAMIN C CR PO), Take 1 Tab by mouth every day., Disp: , Rfl:     Current Facility-Administered Medications:   •  ketorolac (TORADOL) injection 30 mg, 30 mg, Intramuscular, Once, Lisa Mcmanus, A.P.R.N.  ALLERGIES: No Known Allergies    Patient's PMH, SocHx, SurgHx, FamHx, Drug allergies and medications were reviewed.     Objective:   /80 (Patient Position: Sitting)   Pulse 97   Temp (!) 35.6 °C (96.1 °F) (Temporal)   Resp 16   Ht 1.829 m (6')   Wt 103.9 kg (229 lb)   SpO2 99%   BMI 31.06 kg/m²     Physical Exam  Vitals signs and nursing note reviewed.   Constitutional:       General: He is awake.      Appearance: Normal appearance. He is well-developed.   HENT:      Head: Normocephalic and  atraumatic.      Right Ear: External ear normal.      Left Ear: External ear normal.      Nose: Nose normal.      Mouth/Throat:      Lips: Pink.      Mouth: Mucous membranes are moist.      Pharynx: Oropharynx is clear.   Eyes:      General: Lids are normal.      Extraocular Movements: Extraocular movements intact.      Conjunctiva/sclera: Conjunctivae normal.   Neck:      Musculoskeletal: Normal range of motion.   Cardiovascular:      Rate and Rhythm: Normal rate and regular rhythm.   Pulmonary:      Effort: Pulmonary effort is normal.   Musculoskeletal: Normal range of motion.        Feet:    Feet:      Comments: Right lateral distal ankle pain and swelling, right heel pain, no erythema, no foreign body, no ecchymosis.  Skin:     General: Skin is warm and dry.   Neurological:      Mental Status: He is alert and oriented to person, place, and time.   Psychiatric:         Mood and Affect: Mood normal.         Behavior: Behavior normal. Behavior is cooperative.         Thought Content: Thought content normal.         Judgment: Judgment normal.         Assessment/Plan:   Assessment    1. Acute idiopathic gout of right foot  - ketorolac (TORADOL) injection 30 mg    Medications given in clinic today as listed, patient was observed for 15 minutes postinjection.  Prior to medication injection, discussed potential risks and side effects, and reviewed his labs with his recent eGFR.  Discussed the use of OTC medications as per package directions on a PRN basis, to include the use of only Tylenol for the next 3 days, and to push fluids as tolerarated.  Differential diagnosis, natural history, and indications for immediate follow-up were discussed.     Return to urgent care clinic or PCP if current symptoms do not improve and/or worsening symptoms occur.  Recommend journaling of pain and gout attacks, may consider discussing with alternate care provider regarding daily maintenance therapy should discontinue. Advised of signs  and symptoms which would warrant further evaluation and /or emergent evaluation in ED.  All questions answered and the patient agrees to the plan of care.       Please note that this dictation was created using voice recognition software. I have made every reasonable attempt to correct obvious errors, but I expect that there may be errors of grammar and possibly content that I did not discover before finalizing the note.

## 2021-07-26 ENCOUNTER — OFFICE VISIT (OUTPATIENT)
Dept: URGENT CARE | Facility: CLINIC | Age: 41
End: 2021-07-26
Payer: COMMERCIAL

## 2021-07-26 VITALS
DIASTOLIC BLOOD PRESSURE: 90 MMHG | HEIGHT: 72 IN | WEIGHT: 220 LBS | TEMPERATURE: 97.8 F | RESPIRATION RATE: 18 BRPM | HEART RATE: 76 BPM | SYSTOLIC BLOOD PRESSURE: 130 MMHG | OXYGEN SATURATION: 96 % | BODY MASS INDEX: 29.8 KG/M2

## 2021-07-26 DIAGNOSIS — M10.9 ACUTE GOUT OF RIGHT FOOT, UNSPECIFIED CAUSE: ICD-10-CM

## 2021-07-26 PROCEDURE — 99213 OFFICE O/P EST LOW 20 MIN: CPT | Performed by: PHYSICIAN ASSISTANT

## 2021-07-26 RX ORDER — KETOROLAC TROMETHAMINE 30 MG/ML
30 INJECTION, SOLUTION INTRAMUSCULAR; INTRAVENOUS ONCE
Status: DISCONTINUED | OUTPATIENT
Start: 2021-07-26 | End: 2021-07-26

## 2021-07-26 RX ORDER — KETOROLAC TROMETHAMINE 30 MG/ML
30 INJECTION, SOLUTION INTRAMUSCULAR; INTRAVENOUS ONCE
Status: COMPLETED | OUTPATIENT
Start: 2021-07-26 | End: 2021-07-26

## 2021-07-26 RX ADMIN — KETOROLAC TROMETHAMINE 30 MG: 30 INJECTION, SOLUTION INTRAMUSCULAR; INTRAVENOUS at 09:05

## 2021-07-26 ASSESSMENT — ENCOUNTER SYMPTOMS
SHORTNESS OF BREATH: 0
VOMITING: 0
EYE DISCHARGE: 0
HEADACHES: 0
NAUSEA: 0
COUGH: 0
FEVER: 0
SORE THROAT: 0
EYE REDNESS: 0
JOINT SWELLING: 1

## 2021-07-26 NOTE — PROGRESS NOTES
Subjective:      Dinh Gallardo is a 41 y.o. male who presents with Gout (started today around 2am, right foot, pain has progressed and painful)            This is a new problem.  The patient presents to clinic complaining of an acute gout flare to the right foot onset today at approximately 2 AM.  The patient reports a history of gout.  The patient states he developed pain to the lateral aspect of his right foot.  The patient reports associated swelling and redness to the right foot.  The patient reports increased pain with walking.  He also notes intermittent tingling of the right foot, which she attributes to the associated swelling.  The patient reports no recent injury or trauma to his right foot.  The patient has taken OTC Advil for his current symptoms.  The patient states he was previously prescribed allopurinol, but was subsequently taken off of this medication approximately 1 year ago.  The patient states he has an upcoming appointment with his PCP to possibly get back on allopurinol for his gout.    Foot Problem  This is a new problem. The current episode started today. The problem occurs constantly. The problem has been unchanged. Associated symptoms include joint swelling. Pertinent negatives include no chest pain, congestion, coughing, fever, headaches, nausea, rash, sore throat or vomiting. The symptoms are aggravated by walking. He has tried NSAIDs for the symptoms.     PMH:  has a past medical history of Cold (10/07/2017), Dental disorder (10/6/09), Elevated triglycerides with high cholesterol, Heart burn, HIV (human immunodeficiency virus infection) (HCC), Indigestion, Other specified disorder of intestines (10/08/2017), Pain, and Psychiatric problem.  MEDS:   Current Outpatient Medications:   •  acetaminophen (TYLENOL) 500 MG Tab, Take 500-1,000 mg by mouth every 6 hours as needed., Disp: , Rfl:   •  Abacavir-Dolutegravir-Lamivud (TRIUMEQ) 600- MG Tab, Take 1 Tab by mouth every  evening., Disp: , Rfl:   •  famotidine (PEPCID) 20 MG Tab, Take 20 mg by mouth 1 time daily as needed., Disp: , Rfl:   •  ibuprofen (MOTRIN) 400 MG Tab, Take 400 mg by mouth every 6 hours as needed., Disp: , Rfl:   •  allopurinol (ZYLOPRIM) 100 MG Tab, Take 100 mg by mouth every day. (Patient not taking: Reported on 7/26/2021), Disp: , Rfl:   •  benzonatate (TESSALON) 200 MG capsule, Take 1 Cap by mouth 3 times a day as needed for Cough. (Patient not taking: Reported on 11/22/2020), Disp: 60 Cap, Rfl: 0  •  indomethacin (INDOCIN) 50 MG Cap, Take 1 Cap by mouth 3 times a day as needed (pain). With food (Patient not taking: Reported on 11/22/2020), Disp: 15 Cap, Rfl: 0  •  amoxicillin-clavulanate (AUGMENTIN) 875-125 MG Tab, Take 1 Tab by mouth 2 times a day. With food (Patient not taking: Reported on 7/22/2020), Disp: 20 Tab, Rfl: 0  •  azithromycin (ZITHROMAX) 500 MG tablet, Take 500 mg by mouth every day. (Patient not taking: Reported on 7/26/2021), Disp: , Rfl:   •  Pseudoephedrine-APAP-DM (TYLENOL COLD/FLU SEVERE DAY PO), Take 2 Tabs by mouth 2 times a day as needed. (Patient not taking: Reported on 7/26/2021), Disp: , Rfl:   •  Ascorbic Acid (VITAMIN C CR PO), Take 1 Tab by mouth every day. (Patient not taking: Reported on 7/26/2021), Disp: , Rfl:   ALLERGIES: No Known Allergies  SURGHX:   Past Surgical History:   Procedure Laterality Date   • UMBILICAL HERNIA REPAIR  10/17/2017    Procedure: UMBILICAL HERNIA REPAIR WITH MESH PLACEMENT;  Surgeon: Floyd Panda M.D.;  Location: [facility];  Service: General   • SHOULDER DECOMPRESSION ARTHROSCOPIC  10/13/2009    Performed by BRITTANY HALEY at [facility]   • SHOULDER ARTHROSCOPY W/ SLAP / LABRAL REPAIR  10/13/2009    Performed by BRITTANY HALEY at [facility]   • SHOULDER ARTHROSCOPY W/ BICIPITAL TENODESIS REPAIR  10/13/2009    Performed by BRITTANY HALEY at [facility]   • TONSILLECTOMY  5/2009   •  "SHOULDER ARTHROSCOPY  3/12/09    right   • APPENDECTOMY CHILD  1993   • OTHER SURGICAL PROCEDURE  1984    \"urinary tract surgery\"     SOCHX:  reports that he has quit smoking. He uses smokeless tobacco. He reports current alcohol use. He reports current drug use. Drug: Marijuana.  FH: Family history was reviewed, no pertinent findings to report      Review of Systems   Constitutional: Negative for fever.   HENT: Negative for congestion, ear pain and sore throat.    Eyes: Negative for discharge and redness.   Respiratory: Negative for cough and shortness of breath.    Cardiovascular: Negative for chest pain and leg swelling.   Gastrointestinal: Negative for nausea and vomiting.   Musculoskeletal: Positive for joint swelling. Negative for joint pain.   Skin: Negative for rash.   Neurological: Negative for headaches.          Objective:     /90 (BP Location: Left arm, Patient Position: Sitting, BP Cuff Size: Large adult)   Pulse 76   Temp 36.6 °C (97.8 °F) (Temporal)   Resp 18   Ht 1.829 m (6')   Wt 99.8 kg (220 lb)   SpO2 96%   BMI 29.84 kg/m²      Physical Exam  Constitutional:       General: He is not in acute distress.     Appearance: Normal appearance. He is well-developed. He is not ill-appearing.   HENT:      Head: Normocephalic and atraumatic.      Right Ear: External ear normal.      Left Ear: External ear normal.   Eyes:      Extraocular Movements: Extraocular movements intact.      Conjunctiva/sclera: Conjunctivae normal.   Cardiovascular:      Rate and Rhythm: Normal rate.   Pulmonary:      Effort: Pulmonary effort is normal.   Musculoskeletal:      Cervical back: Normal range of motion and neck supple.      Comments:   Right Foot:  Tenderness to the lateral proximal aspect of the right foot inferior to the lateral malleolus with trace swelling and overlying erythema.  No increased warmth.  No open wounds/abrasions.  No ecchymosis.  ROM intact -the patient demonstrates full active range of " motion of the right ankle/foot  Neurovascular intact distally  Strength 5/5 -dorsiflexion/plantarflexion of the right ankle/foot against resistance  Antalgic gait   Skin:     General: Skin is warm and dry.   Neurological:      Mental Status: He is alert and oriented to person, place, and time.            Progress:    Toradol 30 mg IM given in clinic.  The patient has taken this medication in the past without side effects.     Offered the patient a short course of steroids for his acute gout flare.  The patient declined steroid treatment at this time.  The patient elects to follow-up with his PCP later today regarding treatment of his acute gout flare.       Assessment/Plan:          1. Acute gout of right foot, unspecified cause  - ketorolac (TORADOL) injection 30 mg    Differential diagnoses, supportive care, and indications for immediate follow-up discussed with patient.   Instructed to return to clinic or nearest emergency department for any change in condition, further concerns, or worsening of symptoms.    OTC NSAIDs for pain/discomfort   --Avoid additional NSAIDs 8-12 hours following the Toradol injection  RICE  Weight-bearing as tolerated  Follow-up with PCP   Return to clinic or go tot he ED if symptoms worsen or fail to improve, or if the patient should develop worsening/increasing pain/tenderness, swelling, bruising, redness or warmth to the affected area, decreased ROM, numbness, tingling or weakness, difficulty walking, fever/chills, and/or any concerning symptoms.     Discussed plan with the patient, and he agrees to the above.    I personally reviewed prior external notes and test results pertinent to today's visit.  I have independently reviewed and interpreted all diagnostics ordered during this urgent care visit.     Time spent evaluating this patient was at least 30 minutes and includes preparing for visit, obtaining history, exam and evaluation, ordering labs/tests/procedures/medications,  independent interpretation, and counseling/education.    Please note that this dictation was created using voice recognition software. I have made every reasonable attempt to correct obvious errors, but I expect that there may be errors of grammar and possibly content that I did not discover before finalizing the note.     This note was electronically signed by Bernadette Elena PA-C

## 2022-03-13 NOTE — MR AVS SNAPSHOT
"        Dinh Gallardo   2017 8:15 AM   Office Visit   MRN: [id number]    Department:  [facility]   Dept Phone:  [phone]    Description:  Male : 1980   Provider:  Johnson Mcdermott PA-C           Reason for Visit     Penile Discharge x 2 weeks/ burning with urinating/std check/ blood in urine/lower back pain      Allergies as of 2017     No Known Allergies      You were diagnosed with     STD exposure   [4348254]       Dysuria   [788.1.ICD-9-CM]       Pyuria   [550343]         Vital Signs     Blood Pressure Pulse Temperature Respirations Height Weight    110/80 mmHg 127 37.5 °C (99.5 °F) 18 1.829 m (6' 0.01\") 97.523 kg (215 lb)    Body Mass Index Oxygen Saturation Smoking Status             29.15 kg/m2 100% Former Smoker         Basic Information     Date Of Birth Sex Race Ethnicity Preferred Language    1980 Male White Non- English      Health Maintenance        Date Due Completion Dates    IMM DTaP/Tdap/Td Vaccine (1 - Tdap) 3/11/2000 ---            Results     POCT Urinalysis      Component Value Standard Range & Units    POC Color straw Negative    POC Appearance cloudy Negative    POC Leukocyte Esterase lg Negative    POC Nitrites neg Negative    POC Urobiligen 0.2 Negative (0.2) mg/dL    POC Protein lg Negative mg/dL    POC Urine PH 6.0 5.0 - 8.0    POC Blood lg Negative    POC Specific Gravity 1.010 <1.005 - >1.030    POC Ketones mod Negative mg/dL    POC Biliruben neg Negative mg/dL    POC Glucose neg Negative mg/dL                        Current Immunizations     No immunizations on file.      Below and/or attached are the medications your provider expects you to take. Review all of your home medications and newly ordered medications with your provider and/or pharmacist. Follow medication instructions as directed by your provider and/or pharmacist. Please keep your medication list with you and share with your provider. Update the information when medications " are discontinued, doses are changed, or new medications (including over-the-counter products) are added; and carry medication information at all times in the event of emergency situations     Allergies:  No Known Allergies          Medications  Valid as of: May 31, 2017 -  9:13 AM    Generic Name Brand Name Tablet Size Instructions for use    Acetaminophen (Tab) TYLENOL 325 MG Take  by mouth as needed for Mild Pain.        Ascorbic Acid   Take  by mouth every day.        Azithromycin (Tab) ZITHROMAX 500 MG Take 2 Tabs by mouth Once for 1 dose.        Calcium Carbonate Antacid   Take  by mouth as needed.        Cyclobenzaprine HCl (Tab) FLEXERIL 10 MG Take 1 Tab by mouth 3 times a day as needed for Mild Pain or Muscle Spasms ((or use qhs) (medicine will cause drowsiness)).        Hydrocodone-Acetaminophen (Tab) NORCO 5-325 MG Take  by mouth every four hours as needed for Mild Pain.        Ibuprofen (Tab) MOTRIN 600 MG Take 1 Tab by mouth every 6 hours as needed for Mild Pain.        RaNITidine HCl (Cap) ZANTAC 150 MG Take  by mouth as needed.        TraMADol HCl (Tab) ULTRAM 50 MG Take 1 Tab by mouth every 6 hours as needed. for moderate to severe pain        .                 Medicines prescribed today were sent to:     [id number] - [place], [place] - [address] [address]    Phone: [phone] Fax: [phone]    Open 24 Hours?: No      Medication refill instructions:       If your prescription bottle indicates you have medication refills left, it is not necessary to call your provider’s office. Please contact your pharmacy and they will refill your medication.    If your prescription bottle indicates you do not have any refills left, you may request refills at any time through one of the following ways: The online [employer] system (except Urgent Care), by calling your provider’s office, or by asking your pharmacy to contact your provider’s office with a refill request. Medication  refills are processed only during regular business hours and may not be available until the next business day. Your provider may request additional information or to have a follow-up visit with you prior to refilling your medication.   *Please Note: Medication refills are assigned a new Rx number when refilled electronically. Your pharmacy may indicate that no refills were authorized even though a new prescription for the same medication is available at the pharmacy. Please request the medicine by name with the pharmacy before contacting your provider for a refill.        Your To Do List     Future Labs/Procedures Complete By Expires    CHLAMYDIA/GC PCR URINE OR SWAB  As directed 5/31/2018    URINE CULTURE(NEW)  As directed 6/1/2018         MyChart Status: Patient Declined         [Annual] : an annual visit.

## 2024-08-16 ENCOUNTER — TELEPHONE (OUTPATIENT)
Dept: HEALTH INFORMATION MANAGEMENT | Facility: OTHER | Age: 44
End: 2024-08-16
Payer: COMMERCIAL

## 2024-10-02 ENCOUNTER — OFFICE VISIT (OUTPATIENT)
Dept: CARDIOLOGY | Facility: MEDICAL CENTER | Age: 44
End: 2024-10-02
Attending: INTERNAL MEDICINE
Payer: COMMERCIAL

## 2024-10-02 VITALS
BODY MASS INDEX: 32.48 KG/M2 | WEIGHT: 239.8 LBS | SYSTOLIC BLOOD PRESSURE: 144 MMHG | RESPIRATION RATE: 14 BRPM | HEIGHT: 72 IN | HEART RATE: 65 BPM | DIASTOLIC BLOOD PRESSURE: 84 MMHG | OXYGEN SATURATION: 94 %

## 2024-10-02 DIAGNOSIS — R06.09 DYSPNEA ON EXERTION: ICD-10-CM

## 2024-10-02 DIAGNOSIS — E78.2 MIXED HYPERLIPIDEMIA: ICD-10-CM

## 2024-10-02 DIAGNOSIS — E66.811 CLASS 1 OBESITY WITH SERIOUS COMORBIDITY AND BODY MASS INDEX (BMI) OF 32.0 TO 32.9 IN ADULT, UNSPECIFIED OBESITY TYPE: ICD-10-CM

## 2024-10-02 DIAGNOSIS — I10 HTN (HYPERTENSION), MALIGNANT: ICD-10-CM

## 2024-10-02 DIAGNOSIS — R40.0 DAYTIME SLEEPINESS: ICD-10-CM

## 2024-10-02 LAB — EKG IMPRESSION: NORMAL

## 2024-10-02 PROCEDURE — 93010 ELECTROCARDIOGRAM REPORT: CPT | Performed by: INTERNAL MEDICINE

## 2024-10-02 PROCEDURE — 3077F SYST BP >= 140 MM HG: CPT | Performed by: INTERNAL MEDICINE

## 2024-10-02 PROCEDURE — 99204 OFFICE O/P NEW MOD 45 MIN: CPT | Performed by: INTERNAL MEDICINE

## 2024-10-02 PROCEDURE — 93005 ELECTROCARDIOGRAM TRACING: CPT | Performed by: INTERNAL MEDICINE

## 2024-10-02 PROCEDURE — 99203 OFFICE O/P NEW LOW 30 MIN: CPT | Performed by: INTERNAL MEDICINE

## 2024-10-02 PROCEDURE — 3079F DIAST BP 80-89 MM HG: CPT | Performed by: INTERNAL MEDICINE

## 2024-10-02 RX ORDER — AMLODIPINE BESYLATE 5 MG/1
5 TABLET ORAL DAILY
Qty: 90 TABLET | Refills: 3 | Status: SHIPPED | OUTPATIENT
Start: 2024-10-02

## 2024-10-02 RX ORDER — ATORVASTATIN CALCIUM 40 MG/1
40 TABLET, FILM COATED ORAL NIGHTLY
Qty: 100 TABLET | Refills: 4 | Status: SHIPPED | OUTPATIENT
Start: 2024-10-02

## 2024-10-02 RX ORDER — ESCITALOPRAM OXALATE 20 MG/1
20 TABLET ORAL DAILY
COMMUNITY

## 2024-10-02 ASSESSMENT — ENCOUNTER SYMPTOMS
SPEECH CHANGE: 0
HEADACHES: 0
PND: 0
LOSS OF CONSCIOUSNESS: 0
SENSORY CHANGE: 0
SHORTNESS OF BREATH: 0
VOMITING: 0
MYALGIAS: 0
BLURRED VISION: 0
BRUISES/BLEEDS EASILY: 0
ABDOMINAL PAIN: 0
HALLUCINATIONS: 0
EYE DISCHARGE: 0
NAUSEA: 0
DOUBLE VISION: 0
CHILLS: 0
EYE PAIN: 0
CLAUDICATION: 0
BLOOD IN STOOL: 0
WEIGHT LOSS: 0
FALLS: 0
DIZZINESS: 0
ORTHOPNEA: 0
COUGH: 0
PALPITATIONS: 0
FEVER: 0
DEPRESSION: 0

## 2024-10-04 ENCOUNTER — PATIENT MESSAGE (OUTPATIENT)
Dept: CARDIOLOGY | Facility: MEDICAL CENTER | Age: 44
End: 2024-10-04
Payer: COMMERCIAL

## 2024-10-04 DIAGNOSIS — Z79.899 HIGH RISK MEDICATION USE: ICD-10-CM

## 2024-11-27 ENCOUNTER — HOSPITAL ENCOUNTER (OUTPATIENT)
Dept: CARDIOLOGY | Facility: MEDICAL CENTER | Age: 44
End: 2024-11-27
Attending: INTERNAL MEDICINE
Payer: COMMERCIAL

## 2024-11-27 DIAGNOSIS — R06.09 DYSPNEA ON EXERTION: ICD-10-CM

## 2024-11-27 DIAGNOSIS — I10 HTN (HYPERTENSION), MALIGNANT: ICD-10-CM

## 2024-11-27 PROCEDURE — 93306 TTE W/DOPPLER COMPLETE: CPT

## 2024-11-29 LAB
LV EJECT FRACT  99904: 59
LV EJECT FRACT MOD 2C 99903: 61.3
LV EJECT FRACT MOD 4C 99902: 57.26
LV EJECT FRACT MOD BP 99901: 59.25

## (undated) DEVICE — PROTECTOR ULNA NERVE - (36PR/CA)

## (undated) DEVICE — ELECTRODE 850 FOAM ADHESIVE - HYDROGEL RADIOTRNSPRNT (50/PK)

## (undated) DEVICE — SET LEADWIRE 5 LEAD BEDSIDE DISPOSABLE ECG (1SET OF 5/EA)

## (undated) DEVICE — SUTURE GENERAL

## (undated) DEVICE — HEAD HOLDER JUNIOR/ADULT

## (undated) DEVICE — DRESSING TRANSPARENT FILM TEGADERM 4 X 4.75" (50EA/BX)"

## (undated) DEVICE — SODIUM CHL IRRIGATION 0.9% 1000ML (12EA/CA)

## (undated) DEVICE — SUCTION INSTRUMENT YANKAUER BULBOUS TIP W/O VENT (50EA/CA)

## (undated) DEVICE — SUTURE 2-0 VICRYL PLUS CT-1 36 (36PK/BX)"

## (undated) DEVICE — PACK MINOR BASIN - (2EA/CA)

## (undated) DEVICE — SLEEVE, VASO, THIGH, MED

## (undated) DEVICE — MASK ANESTHESIA ADULT  - (100/CA)

## (undated) DEVICE — ELECTRODE DUAL RETURN W/ CORD - (50/PK)

## (undated) DEVICE — RESERVOIR SUCTION 100 CC - SILICONE (20EA/CA)

## (undated) DEVICE — KIT ANESTHESIA W/CIRCUIT & 3/LT BAG W/FILTER (20EA/CA)

## (undated) DEVICE — CHLORAPREP 26 ML APPLICATOR - ORANGE TINT(25/CA)

## (undated) DEVICE — SUTURE 2-0 PDS II CT-2 - (36/BX)

## (undated) DEVICE — GOWN WARMING STANDARD FLEX - (30/CA)

## (undated) DEVICE — NEPTUNE 4 PORT MANIFOLD - (20/PK)

## (undated) DEVICE — BLADE SURGICAL #15 - (50/BX 3BX/CA)

## (undated) DEVICE — SET EXTENSION WITH 2 PORTS (48EA/CA) ***PART #2C8610 IS A SUBSTITUTE*****

## (undated) DEVICE — SENSOR SPO2 NEO LNCS ADHESIVE (20/BX) SEE USER NOTES

## (undated) DEVICE — KIT ROOM DECONTAMINATION

## (undated) DEVICE — LACTATED RINGERS INJ 1000 ML - (14EA/CA 60CA/PF)

## (undated) DEVICE — GOWN SURGICAL X-LARGE ULTRA - FILM-REINFORCED (20/CA)

## (undated) DEVICE — SYRINGE 10 ML CONTROL LL (25EA/BX 4BX/CA)

## (undated) DEVICE — CANISTER SUCTION 3000ML MECHANICAL FILTER AUTO SHUTOFF MEDI-VAC NONSTERILE LF DISP  (40EA/CA)

## (undated) DEVICE — SUTURE 4-0 MONOCRYL PLUS PS-2 - 27 INCH (36/BX)

## (undated) DEVICE — DRAPE LAPAROTOMY T SHEET - (12EA/CA)

## (undated) DEVICE — TUBING CLEARLINK DUO-VENT - C-FLO (48EA/CA)